# Patient Record
Sex: FEMALE | Race: WHITE | Employment: UNEMPLOYED | RURAL
[De-identification: names, ages, dates, MRNs, and addresses within clinical notes are randomized per-mention and may not be internally consistent; named-entity substitution may affect disease eponyms.]

---

## 2017-01-11 ENCOUNTER — OFFICE VISIT (OUTPATIENT)
Dept: PRIMARY CARE CLINIC | Age: 52
End: 2017-01-11
Payer: MEDICAID

## 2017-01-11 VITALS
DIASTOLIC BLOOD PRESSURE: 84 MMHG | HEIGHT: 65 IN | WEIGHT: 205 LBS | SYSTOLIC BLOOD PRESSURE: 126 MMHG | OXYGEN SATURATION: 98 % | BODY MASS INDEX: 34.16 KG/M2 | RESPIRATION RATE: 20 BRPM | HEART RATE: 95 BPM

## 2017-01-11 DIAGNOSIS — J43.1 PANLOBULAR EMPHYSEMA (HCC): Primary | Chronic | ICD-10-CM

## 2017-01-11 DIAGNOSIS — I10 ESSENTIAL HYPERTENSION: ICD-10-CM

## 2017-01-11 DIAGNOSIS — F41.9 ANXIETY: Chronic | ICD-10-CM

## 2017-01-11 DIAGNOSIS — G25.81 RLS (RESTLESS LEGS SYNDROME): ICD-10-CM

## 2017-01-11 DIAGNOSIS — K21.9 GASTROESOPHAGEAL REFLUX DISEASE WITHOUT ESOPHAGITIS: ICD-10-CM

## 2017-01-11 DIAGNOSIS — F33.41 RECURRENT MAJOR DEPRESSIVE DISORDER, IN PARTIAL REMISSION (HCC): ICD-10-CM

## 2017-01-11 PROCEDURE — 99214 OFFICE O/P EST MOD 30 MIN: CPT | Performed by: FAMILY MEDICINE

## 2017-01-11 RX ORDER — METOPROLOL TARTRATE 50 MG/1
50 TABLET, FILM COATED ORAL 2 TIMES DAILY
Qty: 60 TABLET | Refills: 5 | Status: SHIPPED | OUTPATIENT
Start: 2017-01-11 | End: 2017-09-15 | Stop reason: SDUPTHER

## 2017-01-11 RX ORDER — BENZONATATE 100 MG/1
100 CAPSULE ORAL 3 TIMES DAILY PRN
Qty: 30 CAPSULE | Refills: 1 | Status: SHIPPED | OUTPATIENT
Start: 2017-01-11 | End: 2017-01-18

## 2017-01-11 RX ORDER — RANITIDINE 150 MG/1
150 TABLET ORAL 2 TIMES DAILY
Qty: 60 TABLET | Refills: 3 | Status: SHIPPED | OUTPATIENT
Start: 2017-01-11 | End: 2017-02-27 | Stop reason: SDUPTHER

## 2017-01-11 ASSESSMENT — ENCOUNTER SYMPTOMS
EYE ITCHING: 0
BACK PAIN: 1
ABDOMINAL PAIN: 0
NAUSEA: 0
EYE DISCHARGE: 0
CONSTIPATION: 0
SORE THROAT: 0
DIARRHEA: 0
VOICE CHANGE: 1
COUGH: 0
EYE REDNESS: 0
VOMITING: 0
RHINORRHEA: 0
SHORTNESS OF BREATH: 0

## 2017-02-18 DIAGNOSIS — F33.9 EPISODE OF RECURRENT MAJOR DEPRESSIVE DISORDER, UNSPECIFIED DEPRESSION EPISODE SEVERITY (HCC): ICD-10-CM

## 2017-02-18 DIAGNOSIS — F41.9 ANXIETY: Chronic | ICD-10-CM

## 2017-02-20 RX ORDER — VENLAFAXINE HYDROCHLORIDE 75 MG/1
CAPSULE, EXTENDED RELEASE ORAL
Qty: 30 CAPSULE | Refills: 2 | Status: SHIPPED | OUTPATIENT
Start: 2017-02-20 | End: 2017-05-28 | Stop reason: SDUPTHER

## 2017-02-21 DIAGNOSIS — G89.29 CHRONIC NECK AND BACK PAIN: ICD-10-CM

## 2017-02-21 DIAGNOSIS — M54.2 CHRONIC NECK AND BACK PAIN: ICD-10-CM

## 2017-02-21 DIAGNOSIS — M54.9 CHRONIC NECK AND BACK PAIN: ICD-10-CM

## 2017-02-21 DIAGNOSIS — M54.2 NECK PAIN: ICD-10-CM

## 2017-02-21 RX ORDER — CYCLOBENZAPRINE HCL 10 MG
10 TABLET ORAL EVERY 8 HOURS PRN
Qty: 90 TABLET | Refills: 5 | Status: SHIPPED | OUTPATIENT
Start: 2017-02-21 | End: 2017-08-02 | Stop reason: SDUPTHER

## 2017-02-21 RX ORDER — BACLOFEN 10 MG/1
TABLET ORAL
Qty: 90 TABLET | Refills: 1 | OUTPATIENT
Start: 2017-02-21

## 2017-02-23 DIAGNOSIS — F41.9 ANXIETY: Chronic | ICD-10-CM

## 2017-02-23 RX ORDER — BUSPIRONE HYDROCHLORIDE 15 MG/1
TABLET ORAL
Qty: 60 TABLET | Refills: 1 | Status: SHIPPED | OUTPATIENT
Start: 2017-02-23 | End: 2017-04-14 | Stop reason: SDUPTHER

## 2017-02-27 DIAGNOSIS — M54.2 NECK PAIN: ICD-10-CM

## 2017-02-27 RX ORDER — BACLOFEN 10 MG/1
TABLET ORAL
Qty: 90 TABLET | Refills: 2 | Status: SHIPPED | OUTPATIENT
Start: 2017-02-27 | End: 2017-07-11 | Stop reason: CLARIF

## 2017-03-08 ENCOUNTER — OFFICE VISIT (OUTPATIENT)
Dept: NEUROLOGY | Facility: CLINIC | Age: 52
End: 2017-03-08

## 2017-03-08 VITALS
BODY MASS INDEX: 38.15 KG/M2 | OXYGEN SATURATION: 99 % | SYSTOLIC BLOOD PRESSURE: 128 MMHG | DIASTOLIC BLOOD PRESSURE: 84 MMHG | WEIGHT: 229 LBS | HEART RATE: 77 BPM | HEIGHT: 65 IN

## 2017-03-08 DIAGNOSIS — M54.12 CERVICAL RADICULOPATHY: ICD-10-CM

## 2017-03-08 DIAGNOSIS — M79.7 FIBROMYALGIA: Primary | ICD-10-CM

## 2017-03-08 DIAGNOSIS — M54.9 CHRONIC NECK AND BACK PAIN: ICD-10-CM

## 2017-03-08 DIAGNOSIS — G89.29 CHRONIC NECK AND BACK PAIN: ICD-10-CM

## 2017-03-08 DIAGNOSIS — G56.03 BILATERAL CARPAL TUNNEL SYNDROME: ICD-10-CM

## 2017-03-08 DIAGNOSIS — M54.2 CHRONIC NECK AND BACK PAIN: ICD-10-CM

## 2017-03-08 DIAGNOSIS — R60.0 PEDAL EDEMA: ICD-10-CM

## 2017-03-08 PROCEDURE — 99214 OFFICE O/P EST MOD 30 MIN: CPT | Performed by: PSYCHIATRY & NEUROLOGY

## 2017-03-08 RX ORDER — GABAPENTIN 800 MG/1
800 TABLET ORAL 4 TIMES DAILY
Qty: 120 TABLET | Refills: 5 | Status: SHIPPED | OUTPATIENT
Start: 2017-03-08 | End: 2017-06-08 | Stop reason: SDUPTHER

## 2017-03-08 RX ORDER — TRAMADOL HYDROCHLORIDE 50 MG/1
50 TABLET ORAL EVERY 6 HOURS PRN
Qty: 120 TABLET | Refills: 2
Start: 2017-03-08 | End: 2017-06-08 | Stop reason: SDUPTHER

## 2017-03-08 RX ORDER — DOXEPIN HYDROCHLORIDE 10 MG/1
10 CAPSULE ORAL NIGHTLY
Qty: 30 CAPSULE | Refills: 11 | Status: SHIPPED | OUTPATIENT
Start: 2017-03-08 | End: 2018-01-30 | Stop reason: SDUPTHER

## 2017-03-08 NOTE — PROGRESS NOTES
Subjective:     Patient ID: Gisselle White is a 51 y.o. female.    Back Pain   This is a chronic problem. The current episode started more than 1 year ago. The problem occurs constantly. The problem is unchanged. Associated symptoms include numbness and weakness. Pertinent negatives include no abdominal pain, chest pain, dysuria, fever or headaches.   Lower Extremity Issue   This is a chronic problem. The current episode started more than 1 year ago. The problem occurs intermittently. The problem has been unchanged. Associated symptoms include fatigue, numbness and weakness. Pertinent negatives include no abdominal pain, arthralgias, chest pain, chills, coughing, fever, headaches, joint swelling, myalgias, nausea, neck pain, rash, sore throat or vomiting.   Neck Pain    This is a chronic problem. The current episode started more than 1 year ago. The problem occurs intermittently. The problem has been unchanged. Associated symptoms include numbness and weakness. Pertinent negatives include no chest pain, fever, headaches or photophobia.   Upper Extremity Issue   The current episode started more than 1 year ago. The problem occurs intermittently. The problem has been unchanged. Associated symptoms include fatigue, numbness and weakness. Pertinent negatives include no abdominal pain, arthralgias, chest pain, chills, coughing, fever, headaches, joint swelling, myalgias, nausea, neck pain, rash, sore throat or vomiting.     The following portions of the patient's history were reviewed and updated as appropriate: allergies, current medications, past family history, past medical history, past social history, past surgical history and problem list.  Lower back still a problem, worse with with activity, has done PT which did not help, reports some sciatica, complains of some pedal edema. Remains on gabapentin. She has had an MRI of ls spine that showed a disk bulge at L4-5. She has persistent neck and arm pain.  Review of  Systems   Constitutional: Positive for fatigue and unexpected weight change. Negative for chills and fever.   HENT: Positive for postnasal drip and voice change. Negative for ear pain, hearing loss, nosebleeds, rhinorrhea and sore throat.    Eyes: Positive for itching. Negative for photophobia, pain, discharge and visual disturbance.   Respiratory: Positive for wheezing. Negative for cough, chest tightness and shortness of breath.    Cardiovascular: Negative for chest pain, palpitations and leg swelling.   Gastrointestinal: Negative for abdominal pain, blood in stool, constipation, diarrhea, nausea and vomiting.   Genitourinary: Negative for dysuria, frequency, hematuria and urgency.   Musculoskeletal: Negative for arthralgias, back pain, gait problem, joint swelling, myalgias, neck pain and neck stiffness.        Joint stiffness, limb pain, limb swelling   Skin: Negative for rash and wound.   Allergic/Immunologic: Negative for environmental allergies and food allergies.   Neurological: Positive for weakness and numbness. Negative for dizziness, tremors, seizures, syncope, speech difficulty, light-headedness and headaches.        Tingling and difficulty walking   Hematological: Negative for adenopathy. Does not bruise/bleed easily.   Psychiatric/Behavioral: Positive for dysphoric mood and sleep disturbance. Negative for agitation, confusion, decreased concentration, hallucinations and suicidal ideas. The patient is nervous/anxious.         Objective:    Neurologic Exam     Mental Status   Oriented to person, place, and time.     Cranial Nerves     CN III, IV, VI   Pupils are equal, round, and reactive to light.  Extraocular motions are normal.     Motor Exam     Strength   Strength 5/5 throughout.       Physical Exam   Constitutional: She is oriented to person, place, and time. She appears well-developed and well-nourished.   HENT:   Head: Normocephalic and atraumatic.   Eyes: EOM are normal. Pupils are equal,  round, and reactive to light.   Neck: Carotid bruit is not present.   Cardiovascular: Normal rate, regular rhythm, normal heart sounds and intact distal pulses.    Pulmonary/Chest: Effort normal and breath sounds normal.   Abdominal: Soft. Bowel sounds are normal.   Musculoskeletal:   Decreased ROM cspine and lspine.   Neurological: She is alert and oriented to person, place, and time. She has normal strength and normal reflexes. No cranial nerve deficit or sensory deficit. She displays a negative Romberg sign. Coordination and gait normal. She displays no Babinski's sign on the right side. She displays no Babinski's sign on the left side.   Skin: Skin is warm.   Psychiatric: She has a normal mood and affect. Her behavior is normal. Thought content normal. Cognition and memory are normal.       Assessment/Plan:     Gisselle was seen today for numbness.    Diagnoses and all orders for this visit:    Fibromyalgia  -     gabapentin (NEURONTIN) 800 MG tablet; Take 1 tablet by mouth 4 (Four) Times a Day.  -     traMADol (ULTRAM) 50 MG tablet; Take 1 tablet by mouth Every 6 (Six) Hours As Needed for moderate pain (4-6).  -     doxepin (SINEquan) 10 MG capsule; Take 1 capsule by mouth Every Night.    Bilateral carpal tunnel syndrome  -     doxepin (SINEquan) 10 MG capsule; Take 1 capsule by mouth Every Night.    Chronic neck and back pain  -     Ambulatory Referral to Pain Management  -     gabapentin (NEURONTIN) 800 MG tablet; Take 1 tablet by mouth 4 (Four) Times a Day.  -     traMADol (ULTRAM) 50 MG tablet; Take 1 tablet by mouth Every 6 (Six) Hours As Needed for moderate pain (4-6).  -     doxepin (SINEquan) 10 MG capsule; Take 1 capsule by mouth Every Night.    Cervical radiculopathy  -     Ambulatory Referral to Pain Management  -     MRI Cervical Spine Without Contrast  -     doxepin (SINEquan) 10 MG capsule; Take 1 capsule by mouth Every Night.    Pedal edema  -     Elastic Bandages & Supports (T.E.D. BELTED  THIGH/M-REGULAR) misc; 1 each Daily As Needed (leg edema).       The patient has read and signed the [facility] Controlled Substance Contract.  I will continue to see patient for regular follow up appointments.  They are well controlled on their medication.  GINO is updated every 3 months. The patient is aware of the potential for addiction and dependence.    The patient has read and signed the [facility] Controlled Substance Contract.  I will continue to see patient for regular follow up appointments.  They are well controlled on their medication.  GINO is updated every 3 months. The patient is aware of the potential for addiction. The patient has read and signed the [facility] Controlled Substance Contract.  I will continue to see patient for regular follow up appointments.  They are well controlled on their medication.  GINO is updated every 3 months. The patient is aware of the potential for addiction and dependence.on and dependence.

## 2017-03-24 ENCOUNTER — TELEPHONE (OUTPATIENT)
Dept: NEUROLOGY | Facility: CLINIC | Age: 52
End: 2017-03-24

## 2017-04-11 ENCOUNTER — OFFICE VISIT (OUTPATIENT)
Dept: PRIMARY CARE CLINIC | Age: 52
End: 2017-04-11
Payer: MEDICAID

## 2017-04-11 ENCOUNTER — HOSPITAL ENCOUNTER (OUTPATIENT)
Dept: OTHER | Age: 52
Discharge: HOME | End: 2017-04-11
Attending: FAMILY MEDICINE | Admitting: FAMILY MEDICINE

## 2017-04-11 VITALS
HEART RATE: 86 BPM | SYSTOLIC BLOOD PRESSURE: 120 MMHG | RESPIRATION RATE: 20 BRPM | DIASTOLIC BLOOD PRESSURE: 76 MMHG | OXYGEN SATURATION: 97 % | HEIGHT: 65 IN | BODY MASS INDEX: 36.92 KG/M2 | WEIGHT: 221.6 LBS

## 2017-04-11 DIAGNOSIS — I10 ESSENTIAL HYPERTENSION: Primary | Chronic | ICD-10-CM

## 2017-04-11 DIAGNOSIS — F41.9 ANXIETY: Chronic | ICD-10-CM

## 2017-04-11 DIAGNOSIS — F33.41 RECURRENT MAJOR DEPRESSIVE DISORDER, IN PARTIAL REMISSION (HCC): ICD-10-CM

## 2017-04-11 DIAGNOSIS — J43.1 PANLOBULAR EMPHYSEMA (HCC): Chronic | ICD-10-CM

## 2017-04-11 DIAGNOSIS — K21.9 GASTROESOPHAGEAL REFLUX DISEASE WITHOUT ESOPHAGITIS: ICD-10-CM

## 2017-04-11 LAB
A/G RATIO: 1.7 (ref 0.8–2)
ALBUMIN SERPL-MCNC: 4.4 G/DL (ref 3.4–4.8)
ALP BLD-CCNC: 91 U/L (ref 25–100)
ALT SERPL-CCNC: 17 U/L (ref 4–36)
ANION GAP SERPL CALCULATED.3IONS-SCNC: 15 MMOL/L (ref 3–16)
AST SERPL-CCNC: 15 U/L (ref 8–33)
BILIRUB SERPL-MCNC: <0.2 MG/DL (ref 0.3–1.2)
BUN BLDV-MCNC: 21 MG/DL (ref 6–20)
CALCIUM SERPL-MCNC: 9.6 MG/DL (ref 8.5–10.5)
CHLORIDE BLD-SCNC: 96 MMOL/L (ref 98–107)
CHOLESTEROL, TOTAL: 218 MG/DL (ref 0–200)
CO2: 26 MMOL/L (ref 20–30)
CREAT SERPL-MCNC: 0.7 MG/DL (ref 0.4–1.2)
GFR AFRICAN AMERICAN: >59
GFR NON-AFRICAN AMERICAN: >60
GLOBULIN: 2.6 G/DL
GLUCOSE BLD-MCNC: 93 MG/DL (ref 74–106)
HCT VFR BLD CALC: 38 % (ref 37–47)
HDLC SERPL-MCNC: 81 MG/DL (ref 40–60)
HEMOGLOBIN: 13.2 G/DL (ref 11.5–16.5)
LDL CHOLESTEROL CALCULATED: 100 MG/DL
LDL CHOLESTEROL DIRECT: 119 MG/DL
MCH RBC QN AUTO: 34.3 PG (ref 27–32)
MCHC RBC AUTO-ENTMCNC: 34.7 G/DL (ref 31–35)
MCV RBC AUTO: 98.7 FL (ref 80–100)
PDW BLD-RTO: 13.4 % (ref 11–16)
PLATELET # BLD: 312 K/UL (ref 150–400)
PMV BLD AUTO: 10 FL (ref 6–10)
POTASSIUM SERPL-SCNC: 3.7 MMOL/L (ref 3.4–5.1)
RBC # BLD: 3.85 M/UL (ref 3.8–5.8)
SODIUM BLD-SCNC: 137 MMOL/L (ref 136–145)
TOTAL PROTEIN: 7 G/DL (ref 6.4–8.3)
TRIGL SERPL-MCNC: 187 MG/DL (ref 0–249)
VLDLC SERPL CALC-MCNC: 37 MG/DL
WBC # BLD: 9.7 K/UL (ref 4–11)

## 2017-04-11 PROCEDURE — 99214 OFFICE O/P EST MOD 30 MIN: CPT | Performed by: FAMILY MEDICINE

## 2017-04-11 RX ORDER — RANITIDINE 150 MG/1
150 TABLET ORAL 2 TIMES DAILY
Qty: 60 TABLET | Refills: 3 | Status: SHIPPED | OUTPATIENT
Start: 2017-04-11 | End: 2017-07-11 | Stop reason: CLARIF

## 2017-04-11 RX ORDER — VARENICLINE TARTRATE 1 MG/1
1 TABLET, FILM COATED ORAL 2 TIMES DAILY
Qty: 60 TABLET | Refills: 3 | Status: SHIPPED | OUTPATIENT
Start: 2017-04-11

## 2017-04-11 RX ORDER — DOXEPIN HYDROCHLORIDE 10 MG/1
10 CAPSULE ORAL DAILY
COMMUNITY
Start: 2017-03-08

## 2017-04-11 RX ORDER — OMEPRAZOLE 20 MG/1
20 CAPSULE, DELAYED RELEASE ORAL DAILY
Qty: 30 CAPSULE | Refills: 3 | Status: SHIPPED | OUTPATIENT
Start: 2017-04-11 | End: 2017-07-25 | Stop reason: SDUPTHER

## 2017-04-11 ASSESSMENT — ENCOUNTER SYMPTOMS
DIARRHEA: 0
BACK PAIN: 1
SORE THROAT: 0
NAUSEA: 0
COUGH: 0
SHORTNESS OF BREATH: 1
EYE DISCHARGE: 0
CONSTIPATION: 0
ABDOMINAL PAIN: 0
VOMITING: 0
RHINORRHEA: 0
EYE ITCHING: 0

## 2017-04-14 DIAGNOSIS — F41.9 ANXIETY: Chronic | ICD-10-CM

## 2017-04-17 RX ORDER — FUROSEMIDE 20 MG/1
20 TABLET ORAL DAILY PRN
Qty: 60 TABLET | Refills: 1 | Status: SHIPPED | OUTPATIENT
Start: 2017-04-17 | End: 2017-07-25 | Stop reason: SDUPTHER

## 2017-04-17 RX ORDER — BUSPIRONE HYDROCHLORIDE 15 MG/1
TABLET ORAL
Qty: 60 TABLET | Refills: 0 | Status: SHIPPED | OUTPATIENT
Start: 2017-04-17 | End: 2017-05-15 | Stop reason: SDUPTHER

## 2017-04-18 ENCOUNTER — TELEPHONE (OUTPATIENT)
Dept: PRIMARY CARE CLINIC | Age: 52
End: 2017-04-18

## 2017-04-20 RX ORDER — ROPINIROLE 1 MG/1
TABLET, FILM COATED ORAL
Qty: 90 TABLET | Refills: 2 | Status: SHIPPED | OUTPATIENT
Start: 2017-04-20 | End: 2017-07-25 | Stop reason: SDUPTHER

## 2017-04-20 RX ORDER — FLUTICASONE PROPIONATE 50 MCG
SPRAY, SUSPENSION (ML) NASAL
Qty: 1 BOTTLE | Refills: 3 | Status: SHIPPED | OUTPATIENT
Start: 2017-04-20 | End: 2017-07-25 | Stop reason: SDUPTHER

## 2017-05-23 ENCOUNTER — PROCEDURE VISIT (OUTPATIENT)
Dept: NEUROLOGY | Facility: CLINIC | Age: 52
End: 2017-05-23

## 2017-05-23 DIAGNOSIS — R20.0 NUMBNESS AND TINGLING: Primary | ICD-10-CM

## 2017-05-23 DIAGNOSIS — R20.2 NUMBNESS AND TINGLING: Primary | ICD-10-CM

## 2017-05-23 PROCEDURE — 95911 NRV CNDJ TEST 9-10 STUDIES: CPT | Performed by: PSYCHIATRY & NEUROLOGY

## 2017-05-23 PROCEDURE — 95886 MUSC TEST DONE W/N TEST COMP: CPT | Performed by: PSYCHIATRY & NEUROLOGY

## 2017-05-24 ENCOUNTER — TELEPHONE (OUTPATIENT)
Dept: NEUROLOGY | Facility: CLINIC | Age: 52
End: 2017-05-24

## 2017-06-02 DIAGNOSIS — G89.29 CHRONIC NECK AND BACK PAIN: ICD-10-CM

## 2017-06-02 DIAGNOSIS — M54.2 CHRONIC NECK AND BACK PAIN: ICD-10-CM

## 2017-06-02 DIAGNOSIS — M79.7 FIBROMYALGIA: ICD-10-CM

## 2017-06-02 DIAGNOSIS — M54.9 CHRONIC NECK AND BACK PAIN: ICD-10-CM

## 2017-06-05 ENCOUNTER — TELEPHONE (OUTPATIENT)
Dept: NEUROLOGY | Facility: CLINIC | Age: 52
End: 2017-06-05

## 2017-06-05 RX ORDER — TRAMADOL HYDROCHLORIDE 50 MG/1
TABLET ORAL
Qty: 120 TABLET | Refills: 1 | OUTPATIENT
Start: 2017-06-05

## 2017-06-05 NOTE — TELEPHONE ENCOUNTER
PATIENT CALLED IN AND WAS CHECKING ON THE TRAMADOL. I EXPLAINED TO HER THAT IT WAS A CONTROLLED MEDICATION AND DR. CHAPMAN HAD TO GIVE THE OKAY. I DID EXPLAINED TO HER THAT HE HADN'T GOT BACK TO THE MEDICAL ASSISTANT YET. I ALSO CHECKED HER MED LIST AND HE PRESCRIBED THE MEDICINE ON 03/08/2017 AND HER APPT IS 06/08/2017 SO SHE SHOULD HAVE ENOUGH TO LAST. SHE STATED SHE DIDN'T KNOW WHAT HAPPENED TO THEM SHE TAKES THEM LIKE THEY ARE PRESCRIBED. I CALLED MAMEGANNESSA AND SHE STATED THAT SHE HAS TO MAKE HER APPT TO GET THE REFILL. SHE STATED THAT SHE UNDERSTOOD.

## 2017-06-07 DIAGNOSIS — M54.9 CHRONIC NECK AND BACK PAIN: ICD-10-CM

## 2017-06-07 DIAGNOSIS — M54.2 CHRONIC NECK AND BACK PAIN: ICD-10-CM

## 2017-06-07 DIAGNOSIS — G89.29 CHRONIC NECK AND BACK PAIN: ICD-10-CM

## 2017-06-07 DIAGNOSIS — M79.7 FIBROMYALGIA: ICD-10-CM

## 2017-06-08 ENCOUNTER — OFFICE VISIT (OUTPATIENT)
Dept: NEUROLOGY | Facility: CLINIC | Age: 52
End: 2017-06-08

## 2017-06-08 VITALS
HEART RATE: 97 BPM | OXYGEN SATURATION: 96 % | WEIGHT: 231 LBS | HEIGHT: 65 IN | BODY MASS INDEX: 38.49 KG/M2 | DIASTOLIC BLOOD PRESSURE: 98 MMHG | SYSTOLIC BLOOD PRESSURE: 145 MMHG

## 2017-06-08 DIAGNOSIS — M54.12 CERVICAL RADICULOPATHY: ICD-10-CM

## 2017-06-08 DIAGNOSIS — G56.03 BILATERAL CARPAL TUNNEL SYNDROME: ICD-10-CM

## 2017-06-08 DIAGNOSIS — G89.29 CHRONIC NECK AND BACK PAIN: ICD-10-CM

## 2017-06-08 DIAGNOSIS — M79.7 FIBROMYALGIA: ICD-10-CM

## 2017-06-08 DIAGNOSIS — M54.2 CHRONIC NECK AND BACK PAIN: ICD-10-CM

## 2017-06-08 DIAGNOSIS — M54.9 CHRONIC NECK AND BACK PAIN: ICD-10-CM

## 2017-06-08 PROCEDURE — 99214 OFFICE O/P EST MOD 30 MIN: CPT | Performed by: PSYCHIATRY & NEUROLOGY

## 2017-06-08 RX ORDER — TRAMADOL HYDROCHLORIDE 50 MG/1
TABLET ORAL
Qty: 120 TABLET | Refills: 1 | OUTPATIENT
Start: 2017-06-08

## 2017-06-08 RX ORDER — GABAPENTIN 800 MG/1
800 TABLET ORAL 4 TIMES DAILY
Qty: 120 TABLET | Refills: 5 | Status: SHIPPED | OUTPATIENT
Start: 2017-06-08 | End: 2017-09-14 | Stop reason: SDUPTHER

## 2017-06-08 RX ORDER — TRAMADOL HYDROCHLORIDE 50 MG/1
75 TABLET ORAL EVERY 6 HOURS PRN
Qty: 180 TABLET | Refills: 2
Start: 2017-06-08 | End: 2017-09-14 | Stop reason: SDUPTHER

## 2017-06-08 NOTE — PROGRESS NOTES
Subjective:     Patient ID: Gisselle White is a 51 y.o. female.    History of Present Illness  The following portions of the patient's history were reviewed and updated as appropriate: allergies, current medications, past family history, past medical history, past social history, past surgical history and problem list.  Aches and pains comes and go, hands hurt more, worse on the left, has had prior right CTS release at [facility] about 3 years ago, EMG showed bilateral moderate CTS,   scheduled for EMG/NCVs of LEs. [facility] denies MRI of c. Spine. She has had 2 lumbar epidurals so far. Needs refills, wants to increase tramadol dose as pain remains uncontrolled.  Review of Systems   Constitutional: Positive for fatigue. Negative for chills, fever and unexpected weight change.   HENT: Negative for ear pain, hearing loss, nosebleeds, rhinorrhea and sore throat.    Eyes: Positive for itching. Negative for photophobia, pain, discharge and visual disturbance.   Respiratory: Positive for shortness of breath. Negative for cough, chest tightness and wheezing.    Cardiovascular: Negative for chest pain, palpitations and leg swelling.   Gastrointestinal: Negative for abdominal pain, blood in stool, constipation, diarrhea, nausea and vomiting.   Endocrine:        MUSCLE WEAKNESS   Genitourinary: Negative for dysuria, frequency, hematuria and urgency.   Musculoskeletal: Negative for arthralgias, back pain, gait problem, joint swelling, myalgias, neck pain and neck stiffness.        JOINT STIFFNESS, LIMB PAIN & SWELLING   Skin: Negative for rash and wound.        DRY SKIN   Allergic/Immunologic: Negative for environmental allergies and food allergies.   Neurological: Positive for dizziness, weakness (LIMB) and numbness. Negative for tremors, seizures, syncope, speech difficulty, light-headedness and headaches.        TINGLING, DIFFICULTY WALKING   Hematological: Negative for adenopathy. Bruises/bleeds easily.   Psychiatric/Behavioral:  Positive for dysphoric mood and sleep disturbance. Negative for agitation, confusion, decreased concentration, hallucinations and suicidal ideas. The patient is nervous/anxious.         Objective:    Neurologic Exam     Mental Status   Oriented to person, place, and time.     Cranial Nerves     CN III, IV, VI   Pupils are equal, round, and reactive to light.  Extraocular motions are normal.     Motor Exam     Strength   Strength 5/5 throughout.       Physical Exam   Constitutional: She is oriented to person, place, and time. She appears well-developed and well-nourished.   HENT:   Head: Normocephalic and atraumatic.   Eyes: EOM are normal. Pupils are equal, round, and reactive to light.   Neck: Neck supple. Carotid bruit is not present.   Cardiovascular: Normal rate, regular rhythm, normal heart sounds and intact distal pulses.    Pulmonary/Chest: Effort normal and breath sounds normal.   Abdominal: Soft. Bowel sounds are normal.   Musculoskeletal:   Decreased ROM spine, positive bilateral Tinel's at wrists.   Neurological: She is alert and oriented to person, place, and time. She has normal strength and normal reflexes. No cranial nerve deficit or sensory deficit. She displays a negative Romberg sign. Coordination and gait normal. She displays no Babinski's sign on the right side. She displays no Babinski's sign on the left side.   Skin: Skin is warm.   Psychiatric: She has a normal mood and affect. Her behavior is normal. Thought content normal. Cognition and memory are normal.       Assessment/Plan:     Gisselle was seen today for numbness.    Diagnoses and all orders for this visit:    Chronic neck and back pain  -     traMADol (ULTRAM) 50 MG tablet; Take 1.5 tablets by mouth Every 6 (Six) Hours As Needed for Moderate Pain (4-6).  -     gabapentin (NEURONTIN) 800 MG tablet; Take 1 tablet by mouth 4 (Four) Times a Day.  -     Ambulatory Referral to Neurosurgery  -     Ambulatory Referral to Physical  Therapy    Fibromyalgia  -     traMADol (ULTRAM) 50 MG tablet; Take 1.5 tablets by mouth Every 6 (Six) Hours As Needed for Moderate Pain (4-6).  -     gabapentin (NEURONTIN) 800 MG tablet; Take 1 tablet by mouth 4 (Four) Times a Day.    Bilateral carpal tunnel syndrome  -     Ambulatory Referral to Neurosurgery    Cervical radiculopathy  -     Ambulatory Referral to Physical Therapy       The patient has read and signed the [facility] [employer] Substance Contract.  I will continue to see patient for regular follow up appointments.  They are well controlled on their medication.  GINO is updated every 3 months. The patient is aware of the potential for addiction and dependence.    The patient has read and signed the [facility] [employer] Substance Contract.  I will continue to see patient for regular follow up appointments.  They are well controlled on their medication.  GINO is updated every 3 months. The patient is aware of the potential for addiction. The patient has read and signed the [facility] [employer] Substance Contract.  I will continue to see patient for regular follow up appointments.  They are well controlled on their medication.  GINO is updated every 3 months. The patient is aware of the potential for addiction and dependence.on and dependence.

## 2017-06-09 ENCOUNTER — PROCEDURE VISIT (OUTPATIENT)
Dept: NEUROLOGY | Facility: CLINIC | Age: 52
End: 2017-06-09

## 2017-06-09 DIAGNOSIS — R20.2 NUMBNESS AND TINGLING: Primary | ICD-10-CM

## 2017-06-09 DIAGNOSIS — R20.0 NUMBNESS AND TINGLING: Primary | ICD-10-CM

## 2017-06-09 PROCEDURE — 95886 MUSC TEST DONE W/N TEST COMP: CPT | Performed by: PSYCHIATRY & NEUROLOGY

## 2017-06-09 PROCEDURE — 95911 NRV CNDJ TEST 9-10 STUDIES: CPT | Performed by: PSYCHIATRY & NEUROLOGY

## 2017-06-09 NOTE — PROGRESS NOTES
"Procedure   EMG & Nerve Conduction Test  Date/Time: 6/9/2017 12:53 PM  Performed by: SERENE CARDENAS  Authorized by: SERENE CARDENAS   Consent: Verbal consent obtained.            [facility]   [address], [address]  [place]  [address]   Phone: [phone]  FAX: [phone]           Patient: kyle lozano YOB: 1965  Gender: Female  Reason for study: see below in history section      Visit Date: 6/9/2017 13:26  Age: 51 Years 11 Months Old  Examining Physician: Soraida       The patient has a chief complaint and history of bilateral, upper extremity, lower extremity, numbness,    The patient is referred to have this problem evaluated today with EMG and nerve conduction studies.  The performing physician also acted as a technician on this study.  Please note that in the table \"NR\" stands for \"no response\" therefore testing was performed, but no response was elicited.    A brief neurological exam, revealed no abnormalities in muscle bulk strength reflexes and sensation.      Sensory NCS      Nerve / Sites Rec. Site Distance Onset Lat NP Amp Onset Jordy     cm ms µV m/s   L Sural - Ankle (Calf)      Calf Ankle 14 3.5 1.5 40   R Sural - Ankle (Calf)      Calf Ankle 14 3.5 2.2 40   L Superficial peroneal - Ankle      Lat leg Ankle 8 2.0 1.1 40       Motor NCS      Nerve / Sites Muscle Distance Latency Amplitude Velocity     cm ms mV m/s   L Peroneal - EDB      Ankle EDB 8 4.11 5.8       Fib head EDB 30 9.74 5.4 53   R Peroneal - EDB      Ankle EDB 8 4.27 5.6       Fib head EDB 30 10.73 5.5 46   L Tibial - AH      Ankle AH 8 4.32 2.4       Pop fossa AH 43 15.31 2.4 39   R Tibial - AH      Ankle AH 8 4.27 2.3       Pop fossa AH 43 15.52 2.3 38       F  Wave      Nerve F-min    ms   L Tibial - AH 45   L Peroneal - EDB 46   R Peroneal - EDB 45   R Tibial - AH 51       H Reflex      Nerve H Lat    ms   L Tibial - Soleus 34.0   R Tibial - Soleus 34.8       EMG Summary Table     " Spontaneous MUAP Recruitment    IA Fib PSW Fasc H.F. Amp Dur. PPP Pattern   L. GASTROCN (MED) N None None None None N N N N   L. TIB ANTERIOR N None None None None N N N N   L. ILIOPSOAS N None None None None N N N N   L. QUADRICEPS N None None None None N N N N   L. GLUTEUS MAX N None None None None N N N N   L. L.PARASPINALS N None None None None N N N N   R. L.PARASPINALS N None None None None N N N N   R. GASTROCN (MED) N None None None None N N N N   R. TIB ANTERIOR N None None None None N N N N   R. ILIOPSOAS N None None None None N N N N   R. QUADRICEPS N None None None None N N N N   R. GLUTEUS MAX N None None None None N N N N     1.) SNAPs and NCVs were normal  2.) CMAPs and NCVs were normal  3.) F-waves were normal in persistence and minimal latency.  4.) H-reflexes were normal  5.) EMGs were normal in all muscles tested.    There is currently no electrodiagnostic evidence of a neuromuscular disease.      All NCS were performed at 32C or greater.    The referring health care provider will discuss this report and possible further diagnostic and management options with the patient.      Alec Quigley M.D.                       Diagnoses and all orders for this visit:    Numbness and tingling  -     EMG & Nerve Conduction Test

## 2017-06-21 ENCOUNTER — TELEPHONE (OUTPATIENT)
Dept: NEUROLOGY | Facility: CLINIC | Age: 52
End: 2017-06-21

## 2017-06-21 NOTE — TELEPHONE ENCOUNTER
----- Message from Antonette Castañeda sent at 6/21/2017  9:57 AM EDT -----  Regarding: DR. LOTT  PATIENT CALLED IN, WANTING AN APPT.  SHE CAN'T OPEN HER HAND NOW.  SHE ALSO WANTS THE RESULTS OF HER X RAYS.  I DID VERIFY HER TELEPHONE.  THANKS

## 2017-06-22 NOTE — TELEPHONE ENCOUNTER
She can be seen sooner. Her nerve tests per Dr. OROZCO were normal, which XR is she looking for? Thanks

## 2017-06-28 DIAGNOSIS — M54.9 CHRONIC NECK AND BACK PAIN: ICD-10-CM

## 2017-06-28 DIAGNOSIS — M54.2 CHRONIC NECK AND BACK PAIN: ICD-10-CM

## 2017-06-28 DIAGNOSIS — G89.29 CHRONIC NECK AND BACK PAIN: ICD-10-CM

## 2017-06-28 RX ORDER — CELECOXIB 200 MG/1
CAPSULE ORAL
Qty: 30 CAPSULE | Refills: 3 | Status: SHIPPED | OUTPATIENT
Start: 2017-06-28 | End: 2017-10-29 | Stop reason: SDUPTHER

## 2017-06-30 RX ORDER — CETIRIZINE HYDROCHLORIDE 10 MG/1
10 TABLET ORAL DAILY
COMMUNITY
Start: 2017-05-30 | End: 2017-07-05

## 2017-06-30 RX ORDER — FUROSEMIDE 20 MG/1
20 TABLET ORAL DAILY
COMMUNITY
Start: 2017-05-15

## 2017-06-30 RX ORDER — OMEPRAZOLE 20 MG/1
20 CAPSULE, DELAYED RELEASE ORAL DAILY
COMMUNITY
Start: 2017-05-10 | End: 2017-07-05

## 2017-07-05 ENCOUNTER — OFFICE VISIT (OUTPATIENT)
Dept: NEUROSURGERY | Facility: CLINIC | Age: 52
End: 2017-07-05

## 2017-07-05 VITALS — TEMPERATURE: 98.6 F | BODY MASS INDEX: 38.15 KG/M2 | WEIGHT: 229 LBS | HEIGHT: 65 IN

## 2017-07-05 DIAGNOSIS — G56.03 BILATERAL CARPAL TUNNEL SYNDROME: ICD-10-CM

## 2017-07-05 DIAGNOSIS — M47.812 CERVICAL SPONDYLOSIS WITHOUT MYELOPATHY: Primary | ICD-10-CM

## 2017-07-05 PROCEDURE — 99243 OFF/OP CNSLTJ NEW/EST LOW 30: CPT | Performed by: NEUROLOGICAL SURGERY

## 2017-07-05 RX ORDER — VENLAFAXINE HYDROCHLORIDE 75 MG/1
75 CAPSULE, EXTENDED RELEASE ORAL DAILY
COMMUNITY
Start: 2017-06-28

## 2017-07-05 RX ORDER — DEXAMETHASONE 4 MG/1
TABLET ORAL
COMMUNITY
Start: 2017-06-18 | End: 2017-10-20

## 2017-07-05 RX ORDER — RANITIDINE 150 MG/1
TABLET ORAL
COMMUNITY
Start: 2017-06-21 | End: 2017-10-20

## 2017-07-05 NOTE — PROGRESS NOTES
Patient: Gisselle White  : 1965    Primary Care Provider: Lilia Dunne DO    Requesting Provider: Dr. Sheldon Olvera        History    Chief Complaint:   1.  Bilateral hand pain and numbness.  2.  Chronic neck and back pain.    History of Present Illness: Ms. White is a 52-year-old former  who is seeking her disability due to a litany of medical problems including chronic back and leg pain.  Her main complaint today however is pain in her hands and arms is been ongoing for greater than a year.  She previously had right carpal tunnel release performed at .  She complains of pain in her hands that extends up to the elbows.  She has some numbness and tingling in her hands.  As of late she is more afflicted in the right thumb as well as the 2 ulnar fingers of the hand.  She does not really describe radicular symptoms.  She has no focal weakness.  She reports no bowel or bladder dysfunction.    Review of Systems   Constitutional: Positive for fatigue. Negative for activity change, appetite change, chills, diaphoresis, fever and unexpected weight change.   HENT: Positive for congestion, hearing loss and sinus pressure. Negative for dental problem, drooling, ear discharge, ear pain, facial swelling, mouth sores, nosebleeds, postnasal drip, rhinorrhea, sneezing, sore throat, tinnitus, trouble swallowing and voice change.    Eyes: Negative for photophobia, pain, discharge, redness, itching and visual disturbance.   Respiratory: Positive for apnea, chest tightness and shortness of breath. Negative for cough, wheezing and stridor.    Cardiovascular: Negative for chest pain, palpitations and leg swelling.   Gastrointestinal: Negative for abdominal distention, abdominal pain, anal bleeding, blood in stool, constipation, diarrhea, nausea, rectal pain and vomiting.   Endocrine: Positive for cold intolerance, heat intolerance and polydipsia. Negative for polyphagia and polyuria.   Genitourinary:  "Negative for decreased urine volume, difficulty urinating, dysuria, enuresis, flank pain, frequency, genital sores, hematuria and urgency.   Musculoskeletal: Positive for arthralgias, back pain, myalgias, neck pain and neck stiffness. Negative for gait problem and joint swelling.   Skin: Negative for color change, pallor, rash and wound.   Allergic/Immunologic: Negative for environmental allergies, food allergies and immunocompromised state.   Neurological: Positive for numbness. Negative for dizziness, tremors, seizures, syncope, facial asymmetry, speech difficulty, weakness, light-headedness and headaches.   Hematological: Negative for adenopathy. Does not bruise/bleed easily.   Psychiatric/Behavioral: Positive for sleep disturbance. Negative for agitation, behavioral problems, confusion, decreased concentration, dysphoric mood, hallucinations, self-injury and suicidal ideas. The patient is nervous/anxious. The patient is not hyperactive.    All other systems reviewed and are negative.      The patient's past medical history, past surgical history, family history, and social history have been reviewed at length in the electronic medical record.    Physical Exam:   Temp 98.6 °F (37 °C) (Temporal Artery )   Ht 65\" (165.1 cm)  Wt 229 lb (104 kg)  BMI 38.11 kg/m2  CONSTITUTIONAL: Patient is well-nourished, pleasant and appears stated age.  CV: Heart regular rate and rhythm without murmur, rub, or gallop.  PULMONARY: Lungs are clear to ascultation.  MUSCULOSKELETAL:  Neck tenderness to palpation is not observed.   ROM in neck is normal.  There is some tenderness over the ulnar grooves, right greater than left.  Tinel sign is modestly positive at both wrists.  NEUROLOGICAL:  Orientation, memory, attention span, language function, and cognition have been examined and are intact.  Strength is intact in the upper and lower extremities to direct testing.  Muscle tone is normal throughout.  Station and gait are " normal.  Sensation is intact to light touch testing throughout.  Deep tendon reflexes are 1+ and symmetrical.  Frederic's Sign is negative bilaterally. No clonus is elicited.  Coordination is intact.      Medical Decision Making    Data Review:   Plain films of the cervical spine are fairly unrevealing.  Electrodiagnostic studies of the arms were unrevealing.    Diagnosis:   The patient has a rather diffuse symptomatology.  It's certainly not impossible that she has some low-grade peripheral nerve entrapment.    Treatment Options:   Given her diffuse symptoms and her largely unrevealing studies I have not recommended that she undergo surgical intervention and treatment should remain symptomatic.  She will follow-up in my clinic on an as-needed basis.       Diagnosis Plan   1. Cervical spondylosis without myelopathy     2. Bilateral carpal tunnel syndrome             I, Dr. Morelos, personally performed the services described in the documentation, as scribed in my presence, and it is both accurate and complete.  Scribed for Leandro Morelos MD by Michael Vaughn CMA on 07/05/2017 at 1:18 PM

## 2017-07-11 ENCOUNTER — OFFICE VISIT (OUTPATIENT)
Dept: PRIMARY CARE CLINIC | Age: 52
End: 2017-07-11
Payer: MEDICAID

## 2017-07-11 VITALS
DIASTOLIC BLOOD PRESSURE: 74 MMHG | BODY MASS INDEX: 38.82 KG/M2 | HEIGHT: 65 IN | WEIGHT: 233 LBS | SYSTOLIC BLOOD PRESSURE: 136 MMHG | OXYGEN SATURATION: 96 % | RESPIRATION RATE: 20 BRPM | HEART RATE: 105 BPM

## 2017-07-11 DIAGNOSIS — K21.9 GASTROESOPHAGEAL REFLUX DISEASE WITHOUT ESOPHAGITIS: ICD-10-CM

## 2017-07-11 DIAGNOSIS — I10 ESSENTIAL HYPERTENSION: Primary | Chronic | ICD-10-CM

## 2017-07-11 DIAGNOSIS — J43.1 PANLOBULAR EMPHYSEMA (HCC): Chronic | ICD-10-CM

## 2017-07-11 PROCEDURE — 99214 OFFICE O/P EST MOD 30 MIN: CPT | Performed by: FAMILY MEDICINE

## 2017-07-11 ASSESSMENT — ENCOUNTER SYMPTOMS
CONSTIPATION: 0
EYE ITCHING: 1
RHINORRHEA: 0
NAUSEA: 0
VOMITING: 0
SHORTNESS OF BREATH: 0
COUGH: 1
EYE DISCHARGE: 1
DIARRHEA: 0
ABDOMINAL PAIN: 1
SORE THROAT: 0

## 2017-07-11 ASSESSMENT — PATIENT HEALTH QUESTIONNAIRE - PHQ9
1. LITTLE INTEREST OR PLEASURE IN DOING THINGS: 0
SUM OF ALL RESPONSES TO PHQ QUESTIONS 1-9: 0
SUM OF ALL RESPONSES TO PHQ9 QUESTIONS 1 & 2: 0
2. FEELING DOWN, DEPRESSED OR HOPELESS: 0

## 2017-07-25 DIAGNOSIS — K21.9 GASTROESOPHAGEAL REFLUX DISEASE WITHOUT ESOPHAGITIS: ICD-10-CM

## 2017-07-25 DIAGNOSIS — F41.9 ANXIETY: Chronic | ICD-10-CM

## 2017-07-25 DIAGNOSIS — J43.1 PANLOBULAR EMPHYSEMA (HCC): Chronic | ICD-10-CM

## 2017-07-25 RX ORDER — BUSPIRONE HYDROCHLORIDE 15 MG/1
TABLET ORAL
Qty: 60 TABLET | Refills: 1 | Status: SHIPPED | OUTPATIENT
Start: 2017-07-25 | End: 2017-09-28 | Stop reason: SDUPTHER

## 2017-07-25 RX ORDER — ROPINIROLE 1 MG/1
TABLET, FILM COATED ORAL
Qty: 90 TABLET | Refills: 1 | Status: SHIPPED | OUTPATIENT
Start: 2017-07-25 | End: 2017-09-21 | Stop reason: SDUPTHER

## 2017-07-25 RX ORDER — FLUTICASONE PROPIONATE 50 MCG
SPRAY, SUSPENSION (ML) NASAL
Qty: 1 BOTTLE | Refills: 2 | Status: SHIPPED | OUTPATIENT
Start: 2017-07-25 | End: 2017-10-29 | Stop reason: SDUPTHER

## 2017-07-25 RX ORDER — FUROSEMIDE 20 MG/1
TABLET ORAL
Qty: 30 TABLET | Refills: 0 | Status: SHIPPED | OUTPATIENT
Start: 2017-07-25 | End: 2017-08-29 | Stop reason: SDUPTHER

## 2017-07-25 RX ORDER — OMEPRAZOLE 20 MG/1
CAPSULE, DELAYED RELEASE ORAL
Qty: 30 CAPSULE | Refills: 2 | Status: SHIPPED | OUTPATIENT
Start: 2017-07-25 | End: 2017-10-29 | Stop reason: SDUPTHER

## 2017-08-02 DIAGNOSIS — G89.29 CHRONIC NECK AND BACK PAIN: ICD-10-CM

## 2017-08-02 DIAGNOSIS — M54.2 CHRONIC NECK AND BACK PAIN: ICD-10-CM

## 2017-08-02 DIAGNOSIS — M54.9 CHRONIC NECK AND BACK PAIN: ICD-10-CM

## 2017-08-02 RX ORDER — CYCLOBENZAPRINE HCL 10 MG
TABLET ORAL
Qty: 90 TABLET | Refills: 4 | Status: SHIPPED | OUTPATIENT
Start: 2017-08-02

## 2017-08-29 RX ORDER — FUROSEMIDE 20 MG/1
TABLET ORAL
Qty: 30 TABLET | Refills: 0 | Status: SHIPPED | OUTPATIENT
Start: 2017-08-29 | End: 2017-09-25 | Stop reason: SDUPTHER

## 2017-09-14 ENCOUNTER — TELEPHONE (OUTPATIENT)
Dept: NEUROLOGY | Facility: CLINIC | Age: 52
End: 2017-09-14

## 2017-09-14 ENCOUNTER — OFFICE VISIT (OUTPATIENT)
Dept: NEUROLOGY | Facility: CLINIC | Age: 52
End: 2017-09-14

## 2017-09-14 VITALS
HEIGHT: 65 IN | SYSTOLIC BLOOD PRESSURE: 132 MMHG | DIASTOLIC BLOOD PRESSURE: 84 MMHG | BODY MASS INDEX: 36.65 KG/M2 | WEIGHT: 220 LBS

## 2017-09-14 DIAGNOSIS — G56.03 BILATERAL CARPAL TUNNEL SYNDROME: ICD-10-CM

## 2017-09-14 DIAGNOSIS — M54.2 CHRONIC NECK AND BACK PAIN: Primary | ICD-10-CM

## 2017-09-14 DIAGNOSIS — M54.9 CHRONIC NECK AND BACK PAIN: Primary | ICD-10-CM

## 2017-09-14 DIAGNOSIS — M79.7 FIBROMYALGIA: ICD-10-CM

## 2017-09-14 DIAGNOSIS — G89.29 CHRONIC NECK AND BACK PAIN: Primary | ICD-10-CM

## 2017-09-14 PROCEDURE — 99214 OFFICE O/P EST MOD 30 MIN: CPT | Performed by: PSYCHIATRY & NEUROLOGY

## 2017-09-14 RX ORDER — VARENICLINE TARTRATE 1 MG/1
1 TABLET, FILM COATED ORAL
COMMUNITY
Start: 2017-07-06 | End: 2017-11-01

## 2017-09-14 RX ORDER — CETIRIZINE HYDROCHLORIDE 10 MG/1
10 TABLET ORAL DAILY
COMMUNITY
Start: 2017-08-27

## 2017-09-14 RX ORDER — OMEPRAZOLE 20 MG/1
20 CAPSULE, DELAYED RELEASE ORAL DAILY
COMMUNITY
Start: 2017-08-30 | End: 2018-01-30 | Stop reason: ALTCHOICE

## 2017-09-14 RX ORDER — TRAMADOL HYDROCHLORIDE 50 MG/1
75 TABLET ORAL EVERY 6 HOURS PRN
Qty: 180 TABLET | Refills: 2
Start: 2017-09-14 | End: 2017-11-01

## 2017-09-14 RX ORDER — GABAPENTIN 800 MG/1
800 TABLET ORAL 4 TIMES DAILY
Qty: 120 TABLET | Refills: 5
Start: 2017-09-14 | End: 2017-10-20

## 2017-09-14 NOTE — TELEPHONE ENCOUNTER
Dr. Duncan's will see the pt for carpal tunnel releases. They just need a new referral in epic. Please place referral. Thank you!

## 2017-09-14 NOTE — PROGRESS NOTES
Subjective:     Patient ID: Gisselle White is a 52 y.o. female.    HPI:   History of Present Illness  The following portions of the patient's history were reviewed and updated as appropriate: allergies, current medications, past family history, past medical history, past social history, past surgical history and problem list.     Patient has seen Dr. Morelos, felt not be a surgical candidate for neck and back problems, wants referral to pain management. EMG/NCVs of legs were normal, moderate bilateral CTS in the arms. Complains of persistent hand and wrist pain, numbness, using wrist splints wants referral for CTS releases. Scheduled to start PT.      Past Medical History:   Diagnosis Date   • Anxiety    • Arthritis    • Depression    • Heart disease    • Hyperlipidemia    • Hypertension    • Migraine        Past Surgical History:   Procedure Laterality Date   • APPENDECTOMY     • CAROTID STENT     • CARPAL TUNNEL RELEASE Right 2011       Social History     Social History   • Marital status:      Spouse name: N/A   • Number of children: N/A   • Years of education: N/A     Occupational History   • Not on file.     Social History Main Topics   • Smoking status: Current Every Day Smoker     Packs/day: 0.50   • Smokeless tobacco: Never Used   • Alcohol use Yes      Comment: occasional   • Drug use: Defer   • Sexual activity: Defer     Other Topics Concern   • Not on file     Social History Narrative       Family History   Problem Relation Age of Onset   • Stroke Mother    • Heart disease Mother    • Diabetes Mother    • Hypertension Mother    • Cancer Father    • Cancer Sister    • Stroke Sister    • Hypertension Sister    • Hypertension Brother         Review of Systems   Constitutional: Positive for fatigue. Negative for chills, fever and unexpected weight change.   HENT: Positive for hearing loss. Negative for ear pain, nosebleeds, rhinorrhea and sore throat.    Eyes: Positive for itching. Negative for  photophobia, pain, discharge and visual disturbance.   Respiratory: Positive for cough, chest tightness, shortness of breath and wheezing.    Cardiovascular: Positive for leg swelling. Negative for chest pain and palpitations.   Gastrointestinal: Negative for abdominal pain, blood in stool, constipation, diarrhea, nausea and vomiting.   Endocrine: Positive for polydipsia.   Genitourinary: Negative for dysuria, frequency, hematuria and urgency.   Musculoskeletal: Positive for arthralgias, back pain, myalgias, neck pain and neck stiffness. Negative for gait problem and joint swelling.   Skin: Negative for rash and wound.   Allergic/Immunologic: Negative for environmental allergies and food allergies.   Neurological: Positive for dizziness, weakness and numbness. Negative for tremors, seizures, syncope, speech difficulty, light-headedness and headaches.   Hematological: Negative for adenopathy. Does not bruise/bleed easily.   Psychiatric/Behavioral: Positive for dysphoric mood and sleep disturbance. Negative for agitation, confusion, decreased concentration, hallucinations and suicidal ideas. The patient is nervous/anxious.         Objective:    Neurologic Exam     Mental Status   Oriented to person, place, and time.       Physical Exam   Constitutional: She is oriented to person, place, and time. She appears well-developed and well-nourished.   Cardiovascular: Normal rate and regular rhythm.    Pulmonary/Chest: Effort normal.   Neurological: She is alert and oriented to person, place, and time. She has normal reflexes.   Gait antalgic, uses a cane.   Psychiatric: She has a normal mood and affect. Her behavior is normal. Thought content normal.       Assessment/Plan:       Gisselle was seen today for neck pain and back pain.    Diagnoses and all orders for this visit:    Chronic neck and back pain  -     gabapentin (NEURONTIN) 800 MG tablet; Take 1 tablet by mouth 4 (Four) Times a Day.  -     traMADol (ULTRAM) 50 MG  tablet; Take 1.5 tablets by mouth Every 6 (Six) Hours As Needed for Moderate Pain .  -     Ambulatory Referral to Pain Management    Bilateral carpal tunnel syndrome  -     gabapentin (NEURONTIN) 800 MG tablet; Take 1 tablet by mouth 4 (Four) Times a Day.  -     Ambulatory Referral to Neurosurgery    Fibromyalgia  -     gabapentin (NEURONTIN) 800 MG tablet; Take 1 tablet by mouth 4 (Four) Times a Day.  -     traMADol (ULTRAM) 50 MG tablet; Take 1.5 tablets by mouth Every 6 (Six) Hours As Needed for Moderate Pain .       The patient has read and signed the [facility] Substance Contract.  I will continue to see patient for regular follow up appointments.  They are well controlled on their medication.  GINO is updated every 3 months. The patient is aware of the potential for addiction and dependence.    The patient has read and signed the [facility]stance Contract.  I will continue to see patient for regular follow up appointments.  They are well controlled on their medication.  GINO is updated every 3 months. The patient is aware of the potential for addiction. The patient has read and signed the [facility] Substance Contract.  I will continue to see patient for regular follow up appointments.  They are well controlled on their medication.  GINO is updated every 3 months. The patient is aware of the potential for addiction and dependence.on and dependence.      Sheldon Olvera MD  9/14/2017

## 2017-09-15 DIAGNOSIS — F33.9 EPISODE OF RECURRENT MAJOR DEPRESSIVE DISORDER, UNSPECIFIED DEPRESSION EPISODE SEVERITY (HCC): ICD-10-CM

## 2017-09-15 DIAGNOSIS — K21.9 GASTROESOPHAGEAL REFLUX DISEASE WITHOUT ESOPHAGITIS: ICD-10-CM

## 2017-09-15 DIAGNOSIS — I10 ESSENTIAL HYPERTENSION: ICD-10-CM

## 2017-09-15 DIAGNOSIS — F41.9 ANXIETY: Chronic | ICD-10-CM

## 2017-09-15 RX ORDER — VENLAFAXINE HYDROCHLORIDE 75 MG/1
CAPSULE, EXTENDED RELEASE ORAL
Qty: 30 CAPSULE | Refills: 2 | Status: SHIPPED | OUTPATIENT
Start: 2017-09-15 | End: 2017-12-04 | Stop reason: SDUPTHER

## 2017-09-15 RX ORDER — RANITIDINE 150 MG/1
TABLET ORAL
Qty: 60 TABLET | Refills: 2 | Status: SHIPPED | OUTPATIENT
Start: 2017-09-15

## 2017-09-15 RX ORDER — METOPROLOL TARTRATE 50 MG/1
TABLET, FILM COATED ORAL
Qty: 60 TABLET | Refills: 4 | Status: SHIPPED | OUTPATIENT
Start: 2017-09-15

## 2017-09-18 DIAGNOSIS — G56.03 BILATERAL CARPAL TUNNEL SYNDROME: Primary | ICD-10-CM

## 2017-09-21 RX ORDER — ROPINIROLE 1 MG/1
TABLET, FILM COATED ORAL
Qty: 90 TABLET | Refills: 0 | Status: SHIPPED | OUTPATIENT
Start: 2017-09-21 | End: 2017-10-18 | Stop reason: SDUPTHER

## 2017-09-26 DIAGNOSIS — J43.1 PANLOBULAR EMPHYSEMA (HCC): Chronic | ICD-10-CM

## 2017-09-26 DIAGNOSIS — E78.5 HYPERLIPIDEMIA, UNSPECIFIED HYPERLIPIDEMIA TYPE: ICD-10-CM

## 2017-09-26 RX ORDER — CETIRIZINE HYDROCHLORIDE 10 MG/1
TABLET ORAL
Qty: 30 TABLET | Refills: 3 | Status: SHIPPED | OUTPATIENT
Start: 2017-09-26

## 2017-09-26 RX ORDER — SIMVASTATIN 20 MG
TABLET ORAL
Qty: 30 TABLET | Refills: 3 | Status: SHIPPED | OUTPATIENT
Start: 2017-09-26

## 2017-09-28 DIAGNOSIS — F41.9 ANXIETY: Chronic | ICD-10-CM

## 2017-09-28 RX ORDER — BUSPIRONE HYDROCHLORIDE 15 MG/1
TABLET ORAL
Qty: 60 TABLET | Refills: 0 | Status: SHIPPED | OUTPATIENT
Start: 2017-09-28 | End: 2017-10-18 | Stop reason: SDUPTHER

## 2017-10-18 RX ORDER — ROPINIROLE 1 MG/1
TABLET, FILM COATED ORAL
Qty: 90 TABLET | Refills: 0 | Status: SHIPPED | OUTPATIENT
Start: 2017-10-18 | End: 2017-11-13 | Stop reason: SDUPTHER

## 2017-10-20 ENCOUNTER — OFFICE VISIT (OUTPATIENT)
Dept: NEUROSURGERY | Facility: CLINIC | Age: 52
End: 2017-10-20

## 2017-10-20 ENCOUNTER — PREP FOR SURGERY (OUTPATIENT)
Dept: OTHER | Facility: HOSPITAL | Age: 52
End: 2017-10-20

## 2017-10-20 VITALS — BODY MASS INDEX: 35.99 KG/M2 | WEIGHT: 216 LBS | TEMPERATURE: 96.8 F | HEIGHT: 65 IN

## 2017-10-20 DIAGNOSIS — G56.03 BILATERAL CARPAL TUNNEL SYNDROME: Primary | ICD-10-CM

## 2017-10-20 DIAGNOSIS — G56.02 CARPAL TUNNEL SYNDROME OF LEFT WRIST: Primary | ICD-10-CM

## 2017-10-20 DIAGNOSIS — M47.812 CERVICAL SPONDYLOSIS WITHOUT MYELOPATHY: ICD-10-CM

## 2017-10-20 PROCEDURE — 99213 OFFICE O/P EST LOW 20 MIN: CPT | Performed by: NEUROLOGICAL SURGERY

## 2017-10-20 RX ORDER — HYDROCODONE BITARTRATE AND ACETAMINOPHEN 5; 325 MG/1; MG/1
TABLET ORAL
COMMUNITY
Start: 2017-10-10 | End: 2017-10-20 | Stop reason: SDUPTHER

## 2017-10-20 RX ORDER — HYDROCODONE BITARTRATE AND ACETAMINOPHEN 5; 325 MG/1; MG/1
1 TABLET ORAL 2 TIMES DAILY PRN
Qty: 50 TABLET | Refills: 0 | Status: SHIPPED | OUTPATIENT
Start: 2017-10-20 | End: 2017-11-01

## 2017-10-20 RX ORDER — SODIUM CHLORIDE 0.9 % (FLUSH) 0.9 %
1-10 SYRINGE (ML) INJECTION AS NEEDED
Status: CANCELLED | OUTPATIENT
Start: 2017-10-20

## 2017-10-20 RX ORDER — CEFAZOLIN SODIUM 2 G/100ML
2 INJECTION, SOLUTION INTRAVENOUS ONCE
Status: CANCELLED | OUTPATIENT
Start: 2017-10-20 | End: 2017-10-20

## 2017-10-20 RX ORDER — BENZONATATE 100 MG/1
100 CAPSULE ORAL AS NEEDED
COMMUNITY
Start: 2017-10-04

## 2017-10-20 RX ORDER — BACLOFEN 10 MG/1
10 TABLET ORAL 3 TIMES DAILY
COMMUNITY
Start: 2017-10-05

## 2017-10-20 RX ORDER — SULFAMETHOXAZOLE AND TRIMETHOPRIM 800; 160 MG/1; MG/1
TABLET ORAL
COMMUNITY
Start: 2017-10-10 | End: 2017-11-01

## 2017-10-20 NOTE — PROGRESS NOTES
"Patient: Gisselle White  : 1965    Primary Care Provider: Provider Not In System    Requesting Provider: As above        History    Chief Complaint:   1.  Bilateral hand pain and numbness.  2.  Chronic neck pain.    History of Present Illness: Ms. White is a 52-year-old former  who is seeking her disability due to a litany of medical problems including chronic back and leg pain.  Her main complaint today however is pain in her hands and arms is been ongoing for greater than a year.  She previously had right carpal tunnel release performed at .  She complains of pain in her hands that extends up to the elbows.  She has some numbness and tingling in her hands.   The left hand afflicts her more than the right.  She is right-handed.  Her symptoms are worse at night.  Wrist splints have been utilized but have been marginally helpful.  She has neck pain but no radicular symptoms.    Review of Systems   Constitutional: Positive for activity change, diaphoresis and fatigue.   HENT: Positive for hearing loss.    Respiratory: Positive for apnea, cough, shortness of breath and wheezing.    Cardiovascular: Positive for leg swelling.   Endocrine: Positive for heat intolerance and polydipsia.   Genitourinary: Positive for difficulty urinating and flank pain.   Musculoskeletal: Positive for arthralgias, back pain, gait problem, joint swelling, myalgias, neck pain and neck stiffness.   Allergic/Immunologic: Positive for environmental allergies.   Neurological: Positive for dizziness, weakness, numbness and headaches.   Psychiatric/Behavioral: Positive for dysphoric mood and sleep disturbance. The patient is nervous/anxious.        The patient's past medical history, past surgical history, family history, and social history have been reviewed at length in the electronic medical record.    Physical Exam:   Temp 96.8 °F (36 °C) (Temporal Artery )   Ht 65\" (165.1 cm)  Wt 216 lb (98 kg)  BMI 35.94 " kg/m2  MUSCULOSKELETAL:  Neck tenderness to palpation is not observed.   ROM in neck is normal.  Tinel sign is positive at both wrists.  In the right hand were she reportedly had carpal tunnel surgery there is an incision in the distal wrist in line with the lateral thumb.  I do not see a carpal tunnel incision.  NEUROLOGICAL:  Strength is intact in the upper and lower extremities to direct testing.  Muscle tone is normal throughout.  Station and gait are normal.  Sensation is intact to light touch testing throughout.  Deep tendon reflexes are 1+ and symmetrical.  Frederic's Sign is negative bilaterally.         Medical Decision Making    Data Review:   Subacromial  I saw or thought that I saw electrodiagnostic studies that were within normal limits.  I may have misread that or seen a different study.  Her current study from the spring of this year and demonstrates moderate bilateral carpal tunnel syndrome.    Diagnosis:   1.  Bilateral carpal tunnel syndrome, left greater than right.  2.  Cervical spondylosis without radiculopathy    Treatment Options:   Given her ongoing to extremity symptoms I have recommended staged carpal tunnel release beginning on the left.  This will likely be helpful but it will probably not eradicate all of her symptoms.  The nature of the procedure as well as the potential risks, complications, limitations, and alternatives to the procedure were discussed at length with the patient and the patient has agreed to proceed with surgery.       Diagnosis Plan   1. Bilateral carpal tunnel syndrome  HYDROcodone-acetaminophen (NORCO) 5-325 MG per tablet   2. Cervical spondylosis without myelopathy       Scribed for Leandro Morelos MD by Kathleen Dominguez CMA on 10/20/2017 at 11:27 AM      I, Dr. Morelos, personally performed the services described in the documentation, as scribed in my presence, and it is both accurate and complete.

## 2017-10-20 NOTE — H&P
Patient: Gisselle White  : 1965     Primary Care Provider: Provider Not In System     Requesting Provider: As above           History     Chief Complaint:   1.  Bilateral hand pain and numbness.  2.  Chronic neck pain.     History of Present Illness: Ms. White is a 52-year-old former  who is seeking her disability due to a litany of medical problems including chronic back and leg pain.  Her main complaint today however is pain in her hands and arms is been ongoing for greater than a year.  She previously had right carpal tunnel release performed at .  She complains of pain in her hands that extends up to the elbows.  She has some numbness and tingling in her hands.   The left hand afflicts her more than the right.  She is right-handed.  Her symptoms are worse at night.  Wrist splints have been utilized but have been marginally helpful.  She has neck pain but no radicular symptoms.     Review of Systems   Constitutional: Positive for activity change, diaphoresis and fatigue.   HENT: Positive for hearing loss.    Respiratory: Positive for apnea, cough, shortness of breath and wheezing.    Cardiovascular: Positive for leg swelling.   Endocrine: Positive for heat intolerance and polydipsia.   Genitourinary: Positive for difficulty urinating and flank pain.   Musculoskeletal: Positive for arthralgias, back pain, gait problem, joint swelling, myalgias, neck pain and neck stiffness.   Allergic/Immunologic: Positive for environmental allergies.   Neurological: Positive for dizziness, weakness, numbness and headaches.   Psychiatric/Behavioral: Positive for dysphoric mood and sleep disturbance. The patient is nervous/anxious.          The patient's past medical history, past surgical history, family history, and social history have been reviewed at length in the electronic medical record.     Past Medical History:   Diagnosis Date   • Anxiety    • Arthritis    • Depression    • Heart disease    •  "Hyperlipidemia    • Hypertension    • Migraine      Past Surgical History:   Procedure Laterality Date   • APPENDECTOMY     • CAROTID STENT     • CARPAL TUNNEL RELEASE Right 2011     Family History   Problem Relation Age of Onset   • Stroke Mother    • Heart disease Mother    • Diabetes Mother    • Hypertension Mother    • Cancer Father    • Cancer Sister    • Stroke Sister    • Hypertension Sister    • Hypertension Brother      Social History     Social History   • Marital status:      Spouse name: N/A   • Number of children: N/A   • Years of education: N/A     Occupational History   • Not on file.     Social History Main Topics   • Smoking status: Current Every Day Smoker     Packs/day: 0.50   • Smokeless tobacco: Never Used   • Alcohol use Yes      Comment: occasional   • Drug use: Defer   • Sexual activity: Defer     Other Topics Concern   • Not on file     Social History Narrative     Allergies   Allergen Reactions   • Claritin [Loratadine]    • Darvon [Propoxyphene]    • Tylenol With Codeine #3 [Acetaminophen-Codeine]        Physical Exam:   Temp 96.8 °F (36 °C) (Temporal Artery )   Ht 65\" (165.1 cm)  Wt 216 lb (98 kg)  BMI 35.94 kg/m2  MUSCULOSKELETAL:  Neck tenderness to palpation is not observed.   ROM in neck is normal.  Tinel sign is positive at both wrists.  In the right hand were she reportedly had carpal tunnel surgery there is an incision in the distal wrist in line with the lateral thumb.  I do not see a carpal tunnel incision.  NEUROLOGICAL:  Strength is intact in the upper and lower extremities to direct testing.  Muscle tone is normal throughout.  Station and gait are normal.  Sensation is intact to light touch testing throughout.  Deep tendon reflexes are 1+ and symmetrical.  Frederic's Sign is negative bilaterally.            Medical Decision Making     Data Review:   Subacromial  I saw or thought that I saw electrodiagnostic studies that were within normal limits.  I may have misread " that or seen a different study.  Her current study from the spring of this year and demonstrates moderate bilateral carpal tunnel syndrome.     Diagnosis:   1.  Bilateral carpal tunnel syndrome, left greater than right.  2.  Cervical spondylosis without radiculopathy     Treatment Options:   Given her ongoing to extremity symptoms I have recommended staged carpal tunnel release beginning on the left.  This will likely be helpful but it will probably not eradicate all of her symptoms.  The nature of the procedure as well as the potential risks, complications, limitations, and alternatives to the procedure were discussed at length with the patient and the patient has agreed to proceed with surgery.          Diagnosis Plan   1. Bilateral carpal tunnel syndrome  HYDROcodone-acetaminophen (NORCO) 5-325 MG per tablet   2. Cervical spondylosis without myelopathy

## 2017-10-23 PROBLEM — G56.02 CARPAL TUNNEL SYNDROME OF LEFT WRIST: Status: ACTIVE | Noted: 2017-10-23

## 2017-10-25 ENCOUNTER — TELEPHONE (OUTPATIENT)
Dept: NEUROLOGY | Facility: CLINIC | Age: 52
End: 2017-10-25

## 2017-10-25 NOTE — TELEPHONE ENCOUNTER
Patient called, stated that she has completed the physical therapy for her neck and would like for Dr Olvera to order the MRI now. Advised her that he was probably waiting for her follow up appointment to order this. Please advise. Thanks!!

## 2017-10-26 DIAGNOSIS — M54.12 CERVICAL RADICULOPATHY: Primary | ICD-10-CM

## 2017-10-29 DIAGNOSIS — K21.9 GASTROESOPHAGEAL REFLUX DISEASE WITHOUT ESOPHAGITIS: ICD-10-CM

## 2017-10-29 DIAGNOSIS — M54.2 CHRONIC NECK AND BACK PAIN: ICD-10-CM

## 2017-10-29 DIAGNOSIS — G89.29 CHRONIC NECK AND BACK PAIN: ICD-10-CM

## 2017-10-29 DIAGNOSIS — J43.1 PANLOBULAR EMPHYSEMA (HCC): Chronic | ICD-10-CM

## 2017-10-29 DIAGNOSIS — M54.9 CHRONIC NECK AND BACK PAIN: ICD-10-CM

## 2017-10-30 RX ORDER — OMEPRAZOLE 20 MG/1
CAPSULE, DELAYED RELEASE ORAL
Qty: 30 CAPSULE | Refills: 1 | Status: SHIPPED | OUTPATIENT
Start: 2017-10-30

## 2017-10-30 RX ORDER — CELECOXIB 200 MG/1
CAPSULE ORAL
Qty: 30 CAPSULE | Refills: 2 | Status: SHIPPED | OUTPATIENT
Start: 2017-10-30

## 2017-10-30 RX ORDER — FLUTICASONE PROPIONATE 50 MCG
SPRAY, SUSPENSION (ML) NASAL
Qty: 1 BOTTLE | Refills: 1 | Status: SHIPPED | OUTPATIENT
Start: 2017-10-30

## 2017-11-01 ENCOUNTER — APPOINTMENT (OUTPATIENT)
Dept: PREADMISSION TESTING | Facility: HOSPITAL | Age: 52
End: 2017-11-01

## 2017-11-01 VITALS — WEIGHT: 212.08 LBS | BODY MASS INDEX: 35.34 KG/M2 | HEIGHT: 65 IN

## 2017-11-01 DIAGNOSIS — G56.02 CARPAL TUNNEL SYNDROME OF LEFT WRIST: Primary | ICD-10-CM

## 2017-11-01 LAB
DEPRECATED RDW RBC AUTO: 48.2 FL (ref 37–54)
ERYTHROCYTE [DISTWIDTH] IN BLOOD BY AUTOMATED COUNT: 13.2 % (ref 11.3–14.5)
HCT VFR BLD AUTO: 40.8 % (ref 34.5–44)
HGB BLD-MCNC: 13.8 G/DL (ref 11.5–15.5)
MCH RBC QN AUTO: 33.8 PG (ref 27–31)
MCHC RBC AUTO-ENTMCNC: 33.8 G/DL (ref 32–36)
MCV RBC AUTO: 100 FL (ref 80–99)
MRSA DNA SPEC QL NAA+PROBE: POSITIVE
PLATELET # BLD AUTO: 231 10*3/MM3 (ref 150–450)
PMV BLD AUTO: 10.7 FL (ref 6–12)
POTASSIUM BLD-SCNC: 4 MMOL/L (ref 3.5–5.5)
RBC # BLD AUTO: 4.08 10*6/MM3 (ref 3.89–5.14)
WBC NRBC COR # BLD: 8.09 10*3/MM3 (ref 3.5–10.8)

## 2017-11-01 PROCEDURE — 36415 COLL VENOUS BLD VENIPUNCTURE: CPT

## 2017-11-01 PROCEDURE — 84132 ASSAY OF SERUM POTASSIUM: CPT | Performed by: ANESTHESIOLOGY

## 2017-11-01 PROCEDURE — 85027 COMPLETE CBC AUTOMATED: CPT | Performed by: ANESTHESIOLOGY

## 2017-11-01 PROCEDURE — 87641 MR-STAPH DNA AMP PROBE: CPT | Performed by: ANESTHESIOLOGY

## 2017-11-01 PROCEDURE — 93005 ELECTROCARDIOGRAM TRACING: CPT

## 2017-11-01 PROCEDURE — 93010 ELECTROCARDIOGRAM REPORT: CPT | Performed by: INTERNAL MEDICINE

## 2017-11-01 RX ORDER — HYDROCODONE BITARTRATE AND ACETAMINOPHEN 10; 325 MG/1; MG/1
1 TABLET ORAL 3 TIMES DAILY PRN
COMMUNITY

## 2017-11-02 ENCOUNTER — HOSPITAL ENCOUNTER (OUTPATIENT)
Facility: HOSPITAL | Age: 52
Discharge: HOME | End: 2017-11-02
Attending: NEUROLOGICAL SURGERY | Admitting: NEUROLOGICAL SURGERY

## 2017-11-02 ENCOUNTER — ANESTHESIA (OUTPATIENT)
Dept: PERIOP | Facility: HOSPITAL | Age: 52
End: 2017-11-02

## 2017-11-02 ENCOUNTER — ANESTHESIA EVENT (OUTPATIENT)
Dept: PERIOP | Facility: HOSPITAL | Age: 52
End: 2017-11-02

## 2017-11-02 VITALS
DIASTOLIC BLOOD PRESSURE: 74 MMHG | RESPIRATION RATE: 16 BRPM | WEIGHT: 212.08 LBS | SYSTOLIC BLOOD PRESSURE: 126 MMHG | OXYGEN SATURATION: 97 % | BODY MASS INDEX: 35.34 KG/M2 | HEIGHT: 65 IN | TEMPERATURE: 97 F | HEART RATE: 70 BPM

## 2017-11-02 DIAGNOSIS — G56.02 CARPAL TUNNEL SYNDROME OF LEFT WRIST: ICD-10-CM

## 2017-11-02 PROCEDURE — 64721 CARPAL TUNNEL SURGERY: CPT | Performed by: NEUROLOGICAL SURGERY

## 2017-11-02 PROCEDURE — 25010000002 VANCOMYCIN: Performed by: PHYSICIAN ASSISTANT

## 2017-11-02 PROCEDURE — 25010000002 PROPOFOL 1000 MG/ML EMULSION: Performed by: NURSE ANESTHETIST, CERTIFIED REGISTERED

## 2017-11-02 PROCEDURE — 94640 AIRWAY INHALATION TREATMENT: CPT

## 2017-11-02 PROCEDURE — 25010000002 PROPOFOL 10 MG/ML EMULSION: Performed by: NURSE ANESTHETIST, CERTIFIED REGISTERED

## 2017-11-02 RX ORDER — SODIUM CHLORIDE 0.9 % (FLUSH) 0.9 %
1-10 SYRINGE (ML) INJECTION AS NEEDED
Status: DISCONTINUED | OUTPATIENT
Start: 2017-11-02 | End: 2017-11-02 | Stop reason: HOSPADM

## 2017-11-02 RX ORDER — LIDOCAINE HYDROCHLORIDE 10 MG/ML
0.5 INJECTION, SOLUTION EPIDURAL; INFILTRATION; INTRACAUDAL; PERINEURAL ONCE AS NEEDED
Status: COMPLETED | OUTPATIENT
Start: 2017-11-02 | End: 2017-11-02

## 2017-11-02 RX ORDER — LIDOCAINE HYDROCHLORIDE 10 MG/ML
INJECTION, SOLUTION INFILTRATION; PERINEURAL AS NEEDED
Status: DISCONTINUED | OUTPATIENT
Start: 2017-11-02 | End: 2017-11-02 | Stop reason: HOSPADM

## 2017-11-02 RX ORDER — MAGNESIUM HYDROXIDE 1200 MG/15ML
LIQUID ORAL AS NEEDED
Status: DISCONTINUED | OUTPATIENT
Start: 2017-11-02 | End: 2017-11-02 | Stop reason: HOSPADM

## 2017-11-02 RX ORDER — PROPOFOL 10 MG/ML
VIAL (ML) INTRAVENOUS AS NEEDED
Status: DISCONTINUED | OUTPATIENT
Start: 2017-11-02 | End: 2017-11-02 | Stop reason: SURG

## 2017-11-02 RX ORDER — FENTANYL CITRATE 50 UG/ML
50 INJECTION, SOLUTION INTRAMUSCULAR; INTRAVENOUS
Status: DISCONTINUED | OUTPATIENT
Start: 2017-11-02 | End: 2017-11-02 | Stop reason: HOSPADM

## 2017-11-02 RX ORDER — IPRATROPIUM BROMIDE AND ALBUTEROL SULFATE 2.5; .5 MG/3ML; MG/3ML
3 SOLUTION RESPIRATORY (INHALATION) ONCE
Status: COMPLETED | OUTPATIENT
Start: 2017-11-02 | End: 2017-11-02

## 2017-11-02 RX ORDER — FAMOTIDINE 10 MG/ML
20 INJECTION, SOLUTION INTRAVENOUS ONCE
Status: CANCELLED | OUTPATIENT
Start: 2017-11-02 | End: 2017-11-02

## 2017-11-02 RX ORDER — FAMOTIDINE 20 MG/1
20 TABLET, FILM COATED ORAL ONCE
Status: COMPLETED | OUTPATIENT
Start: 2017-11-02 | End: 2017-11-02

## 2017-11-02 RX ORDER — SODIUM CHLORIDE, SODIUM LACTATE, POTASSIUM CHLORIDE, CALCIUM CHLORIDE 600; 310; 30; 20 MG/100ML; MG/100ML; MG/100ML; MG/100ML
9 INJECTION, SOLUTION INTRAVENOUS CONTINUOUS
Status: DISCONTINUED | OUTPATIENT
Start: 2017-11-02 | End: 2017-11-02 | Stop reason: HOSPADM

## 2017-11-02 RX ORDER — HYDROMORPHONE HYDROCHLORIDE 1 MG/ML
0.5 INJECTION, SOLUTION INTRAMUSCULAR; INTRAVENOUS; SUBCUTANEOUS
Status: DISCONTINUED | OUTPATIENT
Start: 2017-11-02 | End: 2017-11-02 | Stop reason: HOSPADM

## 2017-11-02 RX ADMIN — PROPOFOL 50 MCG/KG/MIN: 10 INJECTION, EMULSION INTRAVENOUS at 11:31

## 2017-11-02 RX ADMIN — LIDOCAINE HYDROCHLORIDE 0.5 ML: 10 INJECTION, SOLUTION EPIDURAL; INFILTRATION; INTRACAUDAL; PERINEURAL at 10:24

## 2017-11-02 RX ADMIN — VANCOMYCIN HYDROCHLORIDE 1500 MG: 1 INJECTION, POWDER, LYOPHILIZED, FOR SOLUTION INTRAVENOUS at 10:24

## 2017-11-02 RX ADMIN — SODIUM CHLORIDE, POTASSIUM CHLORIDE, SODIUM LACTATE AND CALCIUM CHLORIDE 9 ML/HR: 600; 310; 30; 20 INJECTION, SOLUTION INTRAVENOUS at 10:24

## 2017-11-02 RX ADMIN — SODIUM CHLORIDE, POTASSIUM CHLORIDE, SODIUM LACTATE AND CALCIUM CHLORIDE: 600; 310; 30; 20 INJECTION, SOLUTION INTRAVENOUS at 11:25

## 2017-11-02 RX ADMIN — FAMOTIDINE 20 MG: 20 TABLET, FILM COATED ORAL at 10:24

## 2017-11-02 RX ADMIN — PROPOFOL 50 MG: 10 INJECTION, EMULSION INTRAVENOUS at 11:31

## 2017-11-02 RX ADMIN — IPRATROPIUM BROMIDE AND ALBUTEROL SULFATE 3 ML: .5; 3 SOLUTION RESPIRATORY (INHALATION) at 10:02

## 2017-11-02 NOTE — ANESTHESIA POSTPROCEDURE EVALUATION
"Patient: Gisselle White    Procedure Summary     Date Anesthesia Start Anesthesia Stop Room / Location    11/02/17 1125  [facility] / [facility]       Procedure Diagnosis Surgeon Provider    CARPAL TUNNEL RELEASE LEFT (Left Wrist) Carpal tunnel syndrome of left wrist  (Carpal tunnel syndrome of left wrist [G56.02]) MD Leandro Cowan MD          Anesthesia Type: MAC  Last vitals  BP   111/88 (11/02/17 1011)   Temp   96.7 °F (35.9 °C) (11/02/17 1011)   Pulse   76 (11/02/17 1011)   Resp   16 (11/02/17 1002)     SpO2   94 % (11/02/17 1011)     Post Anesthesia Care and Evaluation    Patient location during evaluation: PACU  Patient participation: complete - patient participated  Level of consciousness: awake and responsive to verbal stimuli  Pain score: 2  Pain management: adequate  Airway patency: patent  Anesthetic complications: No anesthetic complications  PONV Status: none  Cardiovascular status: acceptable  Respiratory status: acceptable  Hydration status: acceptable    Comments: Pt awake and responsive. SV. VSS. Report to RN. Patient Vitals in the past 24 hrs:  11/02/17 1011, BP:111/88, Temp:96.7 °F (35.9 °C), Temp src:Temporal Art, Pulse:76, SpO2:94 %, Height:65\" (165.1 cm), Weight:212 lb 1.3 oz (96.2 kg)  11/02/17 1002, Pulse:72, Resp:16, SpO2:95 %  133/78. p 72. r 16. t 98.1    Post 90/67 sat 99 temp 98 pulse 78 resp 17               "

## 2017-11-02 NOTE — OP NOTE
NEUROSURGICAL OPERATIVE NOTE        PREOPERATIVE DIAGNOSIS:    Left Carpal Tunnel Syndrome      POSTOPERATIVE DIAGNOSIS:  Same      PROCEDURE:  Left carpal tunnel release      SURGEON:  Leandro Morelos M.D.      ANESTHESIA:  Local/Mac      ESTIMATED BLOOD LOSS:  Minimal      SPECIMEN:  None      DRAINS:  None      COMPLICATIONS:  None      CLINICAL NOTE:  The patient is a 52 old woman with a history of pain and sensory alteration involving her hands and fingers.  This has been unremitting despite time and nonoperative measures.  Studies demonstrate significant carpal tunnel syndrome bilaterally.  She now presents for left carpal tunnel release.  The nature of the procedure as well as the potential risks, complications, limitations, and alternatives to the procedure were discussed at length with the patient and the patient has agreed to proceed with surgery.      TECHNICAL NOTE:  The patient was brought to the operating room and placed on the operating room table in supine position. She was provided sedation. A tourniquet was placed about her left upper extremity proximally.  The left upper extremity from the elbow to the fingertips was then prepared and draped in usual fashion.  A linear incision was marked in the left proximal palm in line with the 4th digit. This was infiltrated with 1% lidocaine with sodium bicarbonate.  Tourniquet was inflated and the incision fashioned.  Soft tissues were divided to provide exposure to the transverse carpal ligament.  This was divided sharply using scalpel and tenotomy scissors.  The dissection was conducted proximally and then distally until palmar fat was identified.  Good decompression of the nerve was achieved throughout its course.  The tourniquet was lowered and modest bleeding points were irrigated with bipolar cautery.  The wound was irrigated with an antibiotic-containing solution.  The incision was then closed in an interrupted vertical mattress fashion with 4-0  nylon sutures. Sterile dressing was applied.  She did receive preoperative antibiotics. She was subsequently taken to the recovery room in satisfactory condition.              Leandro Morelos M.D.

## 2017-11-02 NOTE — ANESTHESIA PREPROCEDURE EVALUATION
Anesthesia Evaluation     NPO Solid Status: > 8 hours  NPO Liquid Status: > 8 hours     Airway   Mallampati: II  TM distance: >3 FB  Neck ROM: full  difficult intubation highly probable  Dental    (+) edentulous    Pulmonary    (+) a smoker (Continues to smoke despite coronary stent, asthma, copd and home O2) Current, COPD, asthma (No ER visits, lungs feel good today), sleep apnea on CPAP, decreased breath sounds,   Wheezes: slight expiratory wheeze.  Cardiovascular     ECG reviewed  Rhythm: regular  Rate: normal    (+) hypertension, past MI , cardiac stents (stent placed 2003)   (-) pacemaker, angina, murmur, CABG      Neuro/Psych  (-) seizures, neuromuscular disease, TIA  GI/Hepatic/Renal/Endo    (+) morbid obesity,   (-) liver disease, no renal disease, diabetes    Musculoskeletal     Abdominal    Substance History      OB/GYN          Other                                        Anesthesia Plan    ASA 4     MAC     intravenous induction     Plan discussed with CRNA.

## 2017-11-02 NOTE — INTERVAL H&P NOTE
H&P reviewed. The patient was examined and there are no changes to the H&P.     Dr. Morelos's note from 10/21/2017 was reviewed.    ROS:  Resp: no recent URI or ED visits. Use home oxygen cont. Continues to smoke. Will get DUONEB in PREOP.    Heart: RRR  Lungs: Diminished in bases. No wheezing    Immunizations:    Tetanus:     Influenza:     Pneumo:    Labs were reviewed.     EKG: NSR     Plan: Carpal Tunnel Release Left

## 2017-11-03 ENCOUNTER — HOSPITAL ENCOUNTER (OUTPATIENT)
Dept: MRI IMAGING | Facility: HOSPITAL | Age: 52
Discharge: HOME | End: 2017-11-03
Attending: PSYCHIATRY & NEUROLOGY | Admitting: PSYCHIATRY & NEUROLOGY

## 2017-11-03 PROCEDURE — 72141 MRI NECK SPINE W/O DYE: CPT

## 2017-11-06 ENCOUNTER — TELEPHONE (OUTPATIENT)
Dept: NEUROLOGY | Facility: CLINIC | Age: 52
End: 2017-11-06

## 2017-11-06 NOTE — TELEPHONE ENCOUNTER
----- Message from Sheldon Olvera MD sent at 11/6/2017  7:39 AM EST -----  Regarding: MRI cspine  Normal, thanks  ----- Message -----     From: Interface, Rad Results [place] In     Sent: 11/3/2017   4:55 PM       To: Sheldon Olvera MD

## 2017-11-13 RX ORDER — ROPINIROLE 1 MG/1
TABLET, FILM COATED ORAL
Qty: 90 TABLET | Refills: 0 | Status: SHIPPED | OUTPATIENT
Start: 2017-11-13

## 2017-11-20 ENCOUNTER — OFFICE VISIT (OUTPATIENT)
Dept: NEUROSURGERY | Facility: CLINIC | Age: 52
End: 2017-11-20

## 2017-11-20 VITALS
WEIGHT: 209 LBS | HEIGHT: 65 IN | SYSTOLIC BLOOD PRESSURE: 120 MMHG | HEART RATE: 79 BPM | BODY MASS INDEX: 34.82 KG/M2 | TEMPERATURE: 98.2 F | DIASTOLIC BLOOD PRESSURE: 86 MMHG

## 2017-11-20 DIAGNOSIS — Z98.890 HISTORY OF CARPAL TUNNEL RELEASE: ICD-10-CM

## 2017-11-20 DIAGNOSIS — G56.02 CARPAL TUNNEL SYNDROME OF LEFT WRIST: Primary | ICD-10-CM

## 2017-11-20 PROCEDURE — 99024 POSTOP FOLLOW-UP VISIT: CPT | Performed by: PHYSICIAN ASSISTANT

## 2017-12-04 DIAGNOSIS — F33.9 EPISODE OF RECURRENT MAJOR DEPRESSIVE DISORDER, UNSPECIFIED DEPRESSION EPISODE SEVERITY (HCC): ICD-10-CM

## 2017-12-04 DIAGNOSIS — F41.9 ANXIETY: Chronic | ICD-10-CM

## 2017-12-04 RX ORDER — VENLAFAXINE HYDROCHLORIDE 75 MG/1
CAPSULE, EXTENDED RELEASE ORAL
Qty: 30 CAPSULE | Refills: 0 | Status: SHIPPED | OUTPATIENT
Start: 2017-12-04

## 2017-12-12 ENCOUNTER — OFFICE VISIT (OUTPATIENT)
Dept: NEUROLOGY | Facility: CLINIC | Age: 52
End: 2017-12-12

## 2017-12-12 VITALS
BODY MASS INDEX: 34.49 KG/M2 | HEART RATE: 73 BPM | WEIGHT: 207 LBS | OXYGEN SATURATION: 98 % | HEIGHT: 65 IN | SYSTOLIC BLOOD PRESSURE: 150 MMHG | DIASTOLIC BLOOD PRESSURE: 109 MMHG

## 2017-12-12 DIAGNOSIS — G56.03 BILATERAL CARPAL TUNNEL SYNDROME: ICD-10-CM

## 2017-12-12 DIAGNOSIS — M79.7 FIBROMYALGIA: Primary | ICD-10-CM

## 2017-12-12 DIAGNOSIS — M54.9 CHRONIC NECK AND BACK PAIN: ICD-10-CM

## 2017-12-12 DIAGNOSIS — M54.2 CHRONIC NECK AND BACK PAIN: ICD-10-CM

## 2017-12-12 DIAGNOSIS — I65.21 STENOSIS OF RIGHT CAROTID ARTERY: ICD-10-CM

## 2017-12-12 DIAGNOSIS — I10 ESSENTIAL HYPERTENSION: ICD-10-CM

## 2017-12-12 DIAGNOSIS — G89.29 CHRONIC NECK AND BACK PAIN: ICD-10-CM

## 2017-12-12 PROCEDURE — 99213 OFFICE O/P EST LOW 20 MIN: CPT | Performed by: PSYCHIATRY & NEUROLOGY

## 2017-12-12 RX ORDER — GABAPENTIN 800 MG/1
800 TABLET ORAL 4 TIMES DAILY
Qty: 120 TABLET | Refills: 5
Start: 2017-12-12

## 2017-12-12 NOTE — PROGRESS NOTES
Subjective:     Patient ID: Gisselle White is a 52 y.o. female.    CC:   Chief Complaint   Patient presents with   • Pain     neck and back        HPI:   History of Present Illness  The following portions of the patient's history were reviewed and updated as appropriate: allergies, current medications, past family history, past medical history, past social history, past surgical history and problem list.     S/P LCTS release, scheduled for RCTS. Followed by pain management. She is off tramadol. Needs gabapentin refills. She has a h/o right carotid stenosis, denies stroke symptoms, needs a follow up study.    Past Medical History:   Diagnosis Date   • Anxiety    • Arthritis    • Back pain    • COPD (chronic obstructive pulmonary disease)    • Depression    • Full dentures    • GERD (gastroesophageal reflux disease)    • Heart disease    • Hyperlipidemia    • Hypertension    • MI (myocardial infarction)     2003- 1 stent placed   • Migraine    • On home O2     2L prn    • ALONZO on CPAP     complaint    • Sleep apnea    • SOB (shortness of breath)    • Wears glasses        Past Surgical History:   Procedure Laterality Date   • APPENDECTOMY     • CAROTID STENT     • CARPAL TUNNEL RELEASE Right 2011   • CARPAL TUNNEL RELEASE Left 11/2/2017    Procedure: CARPAL TUNNEL RELEASE LEFT;  Surgeon: Leandro Morelos MD;  Location: [facility];  Service:    • COLONOSCOPY     • FRACTURE SURGERY      right arm- no hardware   • HYSTERECTOMY      partial left ovary   • TUBAL ABDOMINAL LIGATION     • WISDOM TOOTH EXTRACTION         Social History     Social History   • Marital status:      Spouse name: N/A   • Number of children: N/A   • Years of education: N/A     Occupational History   • Not on file.     Social History Main Topics   • Smoking status: Current Every Day Smoker     Packs/day: 0.50     Years: 33.00     Types: Cigarettes   • Smokeless tobacco: Never Used   • Alcohol use Yes      Comment: occasional   • Drug use: No   •  Sexual activity: Defer     Other Topics Concern   • Not on file     Social History Narrative       Family History   Problem Relation Age of Onset   • Stroke Mother    • Heart disease Mother    • Diabetes Mother    • Hypertension Mother    • Cancer Father    • Cancer Sister    • Stroke Sister    • Hypertension Sister    • Hypertension Brother         Review of Systems   Constitutional: Positive for fatigue. Negative for chills, fever and unexpected weight change.   HENT: Positive for hearing loss and postnasal drip. Negative for ear pain, nosebleeds, rhinorrhea and sore throat.    Eyes: Negative.  Negative for photophobia, pain, discharge, itching and visual disturbance.   Respiratory: Positive for shortness of breath. Negative for cough, chest tightness and wheezing.    Cardiovascular: Positive for leg swelling. Negative for chest pain and palpitations.   Gastrointestinal: Negative.  Negative for abdominal pain, blood in stool, constipation, diarrhea, nausea and vomiting.   Endocrine: Positive for heat intolerance.   Genitourinary: Negative.  Negative for dysuria, frequency, hematuria and urgency.   Musculoskeletal: Positive for arthralgias, back pain, gait problem, joint swelling, myalgias, neck pain and neck stiffness.   Skin: Negative.  Negative for rash and wound.   Allergic/Immunologic: Negative.  Negative for environmental allergies and food allergies.   Neurological: Positive for weakness, numbness and headaches. Negative for dizziness, tremors, seizures, syncope, speech difficulty and light-headedness.   Hematological: Negative for adenopathy. Bruises/bleeds easily.   Psychiatric/Behavioral: Positive for sleep disturbance. Negative for agitation, confusion, decreased concentration, hallucinations and suicidal ideas. The patient is nervous/anxious.         Depression          Objective:    Neurologic Exam     Mental Status   Oriented to person, place, and time.       Physical Exam   Constitutional: She is  oriented to person, place, and time. She appears well-developed and well-nourished.   Cardiovascular: Normal rate and regular rhythm.    Pulmonary/Chest: Effort normal.   Neurological: She is alert and oriented to person, place, and time. She has normal reflexes.   Psychiatric: She has a normal mood and affect. Her behavior is normal. Thought content normal.       Assessment/Plan:       Gisselle was seen today for pain.    Diagnoses and all orders for this visit:    Fibromyalgia  -     gabapentin (NEURONTIN) 800 MG tablet; Take 1 tablet by mouth 4 (Four) Times a Day.    Chronic neck and back pain  -     gabapentin (NEURONTIN) 800 MG tablet; Take 1 tablet by mouth 4 (Four) Times a Day.    Bilateral carpal tunnel syndrome  -     gabapentin (NEURONTIN) 800 MG tablet; Take 1 tablet by mouth 4 (Four) Times a Day.    Stenosis of right carotid artery  -     Duplex Carotid Ultrasound CAR    Essential hypertension        -     Follow up with primary     The patient has read and signed the [facility] Controlled Substance Contract.  I will continue to see patient for regular follow up appointments.  They are well controlled on their medication.  GINO is updated every 3 months. The patient is aware of the potential for addiction and dependence.      Sheldon Olvera MD  12/13/2017

## 2017-12-19 ENCOUNTER — OFFICE VISIT (OUTPATIENT)
Dept: NEUROSURGERY | Facility: CLINIC | Age: 52
End: 2017-12-19

## 2017-12-19 ENCOUNTER — PREP FOR SURGERY (OUTPATIENT)
Dept: OTHER | Facility: HOSPITAL | Age: 52
End: 2017-12-19

## 2017-12-19 VITALS — BODY MASS INDEX: 34.99 KG/M2 | WEIGHT: 210 LBS | HEIGHT: 65 IN | TEMPERATURE: 96.5 F

## 2017-12-19 DIAGNOSIS — G56.03 BILATERAL CARPAL TUNNEL SYNDROME: ICD-10-CM

## 2017-12-19 DIAGNOSIS — G56.01 CARPAL TUNNEL SYNDROME OF RIGHT WRIST: Primary | ICD-10-CM

## 2017-12-19 DIAGNOSIS — Z98.890 HISTORY OF CARPAL TUNNEL RELEASE: Primary | ICD-10-CM

## 2017-12-19 DIAGNOSIS — M47.812 CERVICAL SPONDYLOSIS WITHOUT MYELOPATHY: ICD-10-CM

## 2017-12-19 PROCEDURE — 99024 POSTOP FOLLOW-UP VISIT: CPT | Performed by: NEUROLOGICAL SURGERY

## 2017-12-19 RX ORDER — SODIUM CHLORIDE 0.9 % (FLUSH) 0.9 %
1-10 SYRINGE (ML) INJECTION AS NEEDED
Status: CANCELLED | OUTPATIENT
Start: 2017-12-19

## 2017-12-19 RX ORDER — CEFAZOLIN SODIUM 2 G/100ML
2 INJECTION, SOLUTION INTRAVENOUS ONCE
Status: CANCELLED | OUTPATIENT
Start: 2017-12-19 | End: 2017-12-19

## 2017-12-19 NOTE — H&P
Patient: Gisselle White  : 1965     Primary Care Provider: No Known Provider     Requesting Provider: As above           History     Chief Complaint:   1.  Bilateral hand pain and numbness.  2.  Chronic neck pain.     History of Present Illness: Ms. White is a 52 year old woman with chronic neck and back difficulties.  She is followed in a pain clinic setting.  Norco helps with her low back but does not help in a sustained fashion for her neck pain.  On 17 she underwent left carpal tunnel release and is doing much better in that regard.  Her left hand pain and numbness is improved.  She continues to complain of symptoms on the right side.     Review of Systems   Constitutional: Positive for activity change, diaphoresis and fatigue. Negative for appetite change, chills, fever and unexpected weight change.   HENT: Positive for hearing loss. Negative for congestion, dental problem, drooling, ear discharge, ear pain, facial swelling, mouth sores, nosebleeds, postnasal drip, rhinorrhea, sinus pressure, sneezing, sore throat, tinnitus, trouble swallowing and voice change.    Eyes: Negative for photophobia, pain, discharge, redness, itching and visual disturbance.   Respiratory: Positive for apnea, cough, shortness of breath and wheezing. Negative for choking, chest tightness and stridor.    Cardiovascular: Positive for leg swelling. Negative for chest pain and palpitations.   Gastrointestinal: Negative for abdominal distention, abdominal pain, anal bleeding, blood in stool, constipation, diarrhea, nausea, rectal pain and vomiting.   Endocrine: Positive for heat intolerance and polydipsia. Negative for cold intolerance, polyphagia and polyuria.   Genitourinary: Positive for difficulty urinating and flank pain. Negative for decreased urine volume, dysuria, enuresis, frequency, genital sores, hematuria and urgency.   Musculoskeletal: Positive for arthralgias, back pain, gait problem, joint swelling, myalgias,  neck pain and neck stiffness.   Skin: Negative for color change, pallor, rash and wound.   Allergic/Immunologic: Positive for environmental allergies. Negative for food allergies and immunocompromised state.   Neurological: Positive for dizziness, weakness, numbness and headaches. Negative for tremors, seizures, syncope, facial asymmetry, speech difficulty and light-headedness.   Hematological: Negative for adenopathy. Does not bruise/bleed easily.   Psychiatric/Behavioral: Positive for dysphoric mood and sleep disturbance. Negative for agitation, behavioral problems, confusion, decreased concentration, hallucinations, self-injury and suicidal ideas. The patient is nervous/anxious. The patient is not hyperactive.          The patient's past medical history, past surgical history, family history, and social history have been reviewed at length in the electronic medical record.     Past Medical History:   Diagnosis Date   • Anxiety    • Arthritis    • Back pain    • COPD (chronic obstructive pulmonary disease)    • Depression    • Full dentures    • GERD (gastroesophageal reflux disease)    • Heart disease    • Hyperlipidemia    • Hypertension    • MI (myocardial infarction)     2003- 1 stent placed   • Migraine    • On home O2     2L prn    • ALONZO on CPAP     complaint    • Sleep apnea    • SOB (shortness of breath)    • Wears glasses      Past Surgical History:   Procedure Laterality Date   • APPENDECTOMY     • CAROTID STENT     • CARPAL TUNNEL RELEASE Right 2011   • CARPAL TUNNEL RELEASE Left 11/2/2017    Procedure: CARPAL TUNNEL RELEASE LEFT;  Surgeon: Leandro Morelos MD;  Location: [facility];  Service:    • COLONOSCOPY     • FRACTURE SURGERY      right arm- no hardware   • HYSTERECTOMY      partial left ovary   • TUBAL ABDOMINAL LIGATION     • WISDOM TOOTH EXTRACTION       Family History   Problem Relation Age of Onset   • Stroke Mother    • Heart disease Mother    • Diabetes Mother    • Hypertension Mother    •  "Cancer Father    • Cancer Sister    • Stroke Sister    • Hypertension Sister    • Hypertension Brother      Social History     Social History   • Marital status:      Spouse name: N/A   • Number of children: N/A   • Years of education: N/A     Occupational History   • Not on file.     Social History Main Topics   • Smoking status: Current Every Day Smoker     Packs/day: 0.50     Years: 33.00     Types: Cigarettes   • Smokeless tobacco: Never Used   • Alcohol use Yes      Comment: occasional   • Drug use: No   • Sexual activity: Defer     Other Topics Concern   • Not on file     Social History Narrative     Allergies   Allergen Reactions   • Claritin [Loratadine] Hallucinations     Bugs on wall    • Codeine Hallucinations   • Darvon [Propoxyphene] Hallucinations       Physical Exam:   Temp 96.5 °F (35.8 °C)  Ht 165.1 cm (65\")  Wt 95.3 kg (210 lb)  BMI 34.95 kg/m2  MUSCULOSKELETAL:  Neck tenderness to palpation is not observed.   ROM in neck is normal.  Her left carpal tunnel incision is healing nicely.  NEUROLOGICAL:  Strength is intact in the upper and lower extremities to direct testing.  Muscle tone is normal throughout.  Station and gait are normal.  Sensation is intact to light touch testing throughout.  Deep tendon reflexes are 1+ and symmetrical.  Frederic's Sign is negative bilaterally.     Medical Decision Making     Data Review:   Electrodiagnostic studies have previously documented bilateral carpal tunnel syndrome.     Diagnosis:   1.  Bilateral carpal tunnel syndrome status post release on the left.  2.  Chronic neck pain, mechanical in nature.     Treatment Options:   We will make arrangements to pursue the second stage of her carpal tunnel treatment, right carpal tunnel release.  She would like to pursue this in January.  Once again she is well acquainted with the potential risks, complications, and limitations of the intervention.          Diagnosis Plan   1. History of carpal tunnel release "      2. Bilateral carpal tunnel syndrome      3. Cervical spondylosis without myelopathy

## 2017-12-19 NOTE — PROGRESS NOTES
Patient: Gisselle White  : 1965    Primary Care Provider: No Known Provider    Requesting Provider: As above        History    Chief Complaint:   1.  Bilateral hand pain and numbness.  2.  Chronic neck pain.    History of Present Illness: Ms. White is a 52 year old woman with chronic neck and back difficulties.  She is followed in a pain clinic setting.  Norco helps with her low back but does not help in a sustained fashion for her neck pain.  On 17 she underwent left carpal tunnel release and is doing much better in that regard.  Her left hand pain and numbness is improved.  She continues to complain of symptoms on the right side.    Review of Systems   Constitutional: Positive for activity change, diaphoresis and fatigue. Negative for appetite change, chills, fever and unexpected weight change.   HENT: Positive for hearing loss. Negative for congestion, dental problem, drooling, ear discharge, ear pain, facial swelling, mouth sores, nosebleeds, postnasal drip, rhinorrhea, sinus pressure, sneezing, sore throat, tinnitus, trouble swallowing and voice change.    Eyes: Negative for photophobia, pain, discharge, redness, itching and visual disturbance.   Respiratory: Positive for apnea, cough, shortness of breath and wheezing. Negative for choking, chest tightness and stridor.    Cardiovascular: Positive for leg swelling. Negative for chest pain and palpitations.   Gastrointestinal: Negative for abdominal distention, abdominal pain, anal bleeding, blood in stool, constipation, diarrhea, nausea, rectal pain and vomiting.   Endocrine: Positive for heat intolerance and polydipsia. Negative for cold intolerance, polyphagia and polyuria.   Genitourinary: Positive for difficulty urinating and flank pain. Negative for decreased urine volume, dysuria, enuresis, frequency, genital sores, hematuria and urgency.   Musculoskeletal: Positive for arthralgias, back pain, gait problem, joint swelling, myalgias, neck pain  "and neck stiffness.   Skin: Negative for color change, pallor, rash and wound.   Allergic/Immunologic: Positive for environmental allergies. Negative for food allergies and immunocompromised state.   Neurological: Positive for dizziness, weakness, numbness and headaches. Negative for tremors, seizures, syncope, facial asymmetry, speech difficulty and light-headedness.   Hematological: Negative for adenopathy. Does not bruise/bleed easily.   Psychiatric/Behavioral: Positive for dysphoric mood and sleep disturbance. Negative for agitation, behavioral problems, confusion, decreased concentration, hallucinations, self-injury and suicidal ideas. The patient is nervous/anxious. The patient is not hyperactive.        The patient's past medical history, past surgical history, family history, and social history have been reviewed at length in the electronic medical record.    Physical Exam:   Temp 96.5 °F (35.8 °C)  Ht 165.1 cm (65\")  Wt 95.3 kg (210 lb)  BMI 34.95 kg/m2  MUSCULOSKELETAL:  Neck tenderness to palpation is not observed.   ROM in neck is normal.  Her left carpal tunnel incision is healing nicely.  NEUROLOGICAL:  Strength is intact in the upper and lower extremities to direct testing.  Muscle tone is normal throughout.  Station and gait are normal.  Sensation is intact to light touch testing throughout.  Deep tendon reflexes are 1+ and symmetrical.  Frederic's Sign is negative bilaterally.    Medical Decision Making    Data Review:   Electrodiagnostic studies have previously documented bilateral carpal tunnel syndrome.    Diagnosis:   1.  Bilateral carpal tunnel syndrome status post release on the left.  2.  Chronic neck pain, mechanical in nature.    Treatment Options:   We will make arrangements to pursue the second stage of her carpal tunnel treatment, right carpal tunnel release.  She would like to pursue this in January.  Once again she is well acquainted with the potential risks, complications, and " limitations of the intervention.       Diagnosis Plan   1. History of carpal tunnel release     2. Bilateral carpal tunnel syndrome     3. Cervical spondylosis without myelopathy       Scribed for Leandro Morelos MD by Heather Brown CMA on 12/19/2017 at 1:09 PM    I, Dr. Morelos, personally performed the services described in the documentation, as scribed in my presence, and it is both accurate and complete.

## 2017-12-20 PROBLEM — G56.01 CARPAL TUNNEL SYNDROME OF RIGHT WRIST: Status: ACTIVE | Noted: 2017-12-20

## 2018-01-17 ENCOUNTER — APPOINTMENT (OUTPATIENT)
Dept: PREADMISSION TESTING | Facility: HOSPITAL | Age: 53
End: 2018-01-17

## 2018-01-17 VITALS — BODY MASS INDEX: 35.22 KG/M2 | HEIGHT: 65 IN | WEIGHT: 211.42 LBS

## 2018-01-17 DIAGNOSIS — G56.01 CARPAL TUNNEL SYNDROME OF RIGHT WRIST: ICD-10-CM

## 2018-01-17 LAB
DEPRECATED RDW RBC AUTO: 47 FL (ref 37–54)
ERYTHROCYTE [DISTWIDTH] IN BLOOD BY AUTOMATED COUNT: 13.1 % (ref 11.3–14.5)
HCT VFR BLD AUTO: 39 % (ref 34.5–44)
HGB BLD-MCNC: 13.1 G/DL (ref 11.5–15.5)
MCH RBC QN AUTO: 33.2 PG (ref 27–31)
MCHC RBC AUTO-ENTMCNC: 33.6 G/DL (ref 32–36)
MCV RBC AUTO: 98.7 FL (ref 80–99)
MRSA DNA SPEC QL NAA+PROBE: NEGATIVE
PLATELET # BLD AUTO: 301 10*3/MM3 (ref 150–450)
PMV BLD AUTO: 10.2 FL (ref 6–12)
POTASSIUM BLD-SCNC: 3.7 MMOL/L (ref 3.5–5.5)
RBC # BLD AUTO: 3.95 10*6/MM3 (ref 3.89–5.14)
WBC NRBC COR # BLD: 10.8 10*3/MM3 (ref 3.5–10.8)

## 2018-01-17 PROCEDURE — 87641 MR-STAPH DNA AMP PROBE: CPT | Performed by: NEUROLOGICAL SURGERY

## 2018-01-17 PROCEDURE — 36415 COLL VENOUS BLD VENIPUNCTURE: CPT

## 2018-01-17 PROCEDURE — 85027 COMPLETE CBC AUTOMATED: CPT | Performed by: ANESTHESIOLOGY

## 2018-01-17 PROCEDURE — 84132 ASSAY OF SERUM POTASSIUM: CPT | Performed by: ANESTHESIOLOGY

## 2018-01-17 RX ORDER — ASPIRIN 81 MG/1
81 TABLET ORAL DAILY
COMMUNITY

## 2018-01-17 NOTE — DISCHARGE INSTRUCTIONS
The following information and instructions were given:    NPO after MN except sips of water with routine prescribed medication (except blood thinner, diabetes, or weight reducing medication) unless otherwise instructed by your physician.  Do not eat, drink, smoke or chew gum after MN the night before surgery. This also includes no mints.    DO NOT shave for two days before your procedure.  Do not wear makeup.      DO NOT wear fingernail polish (gel/regular) and/or acrylic/artificial nails on the day of surgery.   If a patient had recent manicure and would rather not remove polish or artificial nails, then the minimum requirement is that the polish/artificial nails must be removed from the middle finger on each hand.      If patient was having surgery on an upper extremity, then the patient was instructed that fingernail polish/artificial fingernails must be removed for surgery.  NO EXCEPTIONS.      If patient was having surgery on a lower extremity, then the patient was instructed that toenail polish on both extremities must be removed for surgery.  NO EXCEPTIONS.    Remove all jewelry (advised to go to jeweler if unable to remove).  Jewelry especially rings can no longer be taped for surgery.    Leave anything you consider valuable at home.    Leave your suitcase in the car until after your surgery.    Bring the following with you (if applicable)   -picture ID and insurance cards   -Co-pay/deductible required by insurance   -Medications in the original bottles (not a list) including all over-the-counter  medications if not brought to PAT   -Copy of advance directive, living will or power of  documents if not  brought to PAT   -CPAP or BIPAP mask and tubing (do not bring machine)   -Skin prep instructions sheet   -PAT Pass    Education booklet, brochure, handout or binder given to patient.    Pain Control After Surgery handout given to patient.    Respirex use (handout given to patient) and pneumonia  prevention.    Signs and Symptoms of infection.    DVT Prevention stressing the importance of ambulation.    Patient to apply Chlorhexadine wipes to surgical area (as instructed) the night before procedure and the AM of procedure.    When applicable for ERAS patients (colon, orthropedic), patients were instructed to drink 20 ounces of Gatorade or G2 for diabetics (or until full) the morning of surgery.  The Gatorade or G2 must be consumed at least 3 hours before surgery start time.  No RED Gatorade or G2.  Appropriated ERAS handout given to patient during PAT visit.      Carpal tunnel book given

## 2018-01-18 ENCOUNTER — HOSPITAL ENCOUNTER (OUTPATIENT)
Dept: CARDIOLOGY | Facility: HOSPITAL | Age: 53
Discharge: HOME | End: 2018-01-18
Attending: PSYCHIATRY & NEUROLOGY

## 2018-01-18 ENCOUNTER — ANESTHESIA EVENT (OUTPATIENT)
Dept: PERIOP | Facility: HOSPITAL | Age: 53
End: 2018-01-18

## 2018-01-18 ENCOUNTER — HOSPITAL ENCOUNTER (OUTPATIENT)
Facility: HOSPITAL | Age: 53
Discharge: HOME | End: 2018-01-18
Attending: NEUROLOGICAL SURGERY | Admitting: NEUROLOGICAL SURGERY

## 2018-01-18 ENCOUNTER — TELEPHONE (OUTPATIENT)
Dept: NEUROLOGY | Facility: CLINIC | Age: 53
End: 2018-01-18

## 2018-01-18 ENCOUNTER — ANESTHESIA (OUTPATIENT)
Dept: PERIOP | Facility: HOSPITAL | Age: 53
End: 2018-01-18

## 2018-01-18 VITALS
TEMPERATURE: 98.3 F | OXYGEN SATURATION: 94 % | HEIGHT: 65 IN | SYSTOLIC BLOOD PRESSURE: 138 MMHG | WEIGHT: 211 LBS | DIASTOLIC BLOOD PRESSURE: 78 MMHG | RESPIRATION RATE: 14 BRPM | HEART RATE: 64 BPM | BODY MASS INDEX: 35.16 KG/M2

## 2018-01-18 DIAGNOSIS — G56.01 CARPAL TUNNEL SYNDROME OF RIGHT WRIST: ICD-10-CM

## 2018-01-18 LAB
BH CV XLRA MEAS LEFT DIST CCA EDV: 32 CM/SEC
BH CV XLRA MEAS LEFT DIST CCA PSV: 80 CM/SEC
BH CV XLRA MEAS LEFT DIST ICA EDV: 51 CM/SEC
BH CV XLRA MEAS LEFT DIST ICA PSV: 104 CM/SEC
BH CV XLRA MEAS LEFT ICA/CCA RATIO: 1.1
BH CV XLRA MEAS LEFT MID CCA EDV: 40 CM/SEC
BH CV XLRA MEAS LEFT MID CCA PSV: 94 CM/SEC
BH CV XLRA MEAS LEFT MID ICA EDV: 41 CM/SEC
BH CV XLRA MEAS LEFT MID ICA PSV: 88 CM/SEC
BH CV XLRA MEAS LEFT PROX CCA EDV: 36 CM/SEC
BH CV XLRA MEAS LEFT PROX CCA PSV: 110 CM/SEC
BH CV XLRA MEAS LEFT PROX ECA EDV: 38 CM/SEC
BH CV XLRA MEAS LEFT PROX ECA PSV: 138 CM/SEC
BH CV XLRA MEAS LEFT PROX ICA EDV: 33 CM/SEC
BH CV XLRA MEAS LEFT PROX ICA PSV: 72 CM/SEC
BH CV XLRA MEAS LEFT PROX SCLA EDV: 0.7 CM/SEC
BH CV XLRA MEAS LEFT PROX SCLA PSV: 98 CM/SEC
BH CV XLRA MEAS LEFT VERTEBRAL A EDV: 19 CM/SEC
BH CV XLRA MEAS LEFT VERTEBRAL A PSV: 43 CM/SEC
BH CV XLRA MEAS RIGHT DIST CCA EDV: 25 CM/SEC
BH CV XLRA MEAS RIGHT DIST CCA PSV: 73 CM/SEC
BH CV XLRA MEAS RIGHT DIST ICA EDV: 40 CM/SEC
BH CV XLRA MEAS RIGHT DIST ICA PSV: 94 CM/SEC
BH CV XLRA MEAS RIGHT ICA/CCA RATIO: 1.3
BH CV XLRA MEAS RIGHT MID CCA EDV: 29 CM/SEC
BH CV XLRA MEAS RIGHT MID CCA PSV: 80 CM/SEC
BH CV XLRA MEAS RIGHT MID ICA EDV: 36 CM/SEC
BH CV XLRA MEAS RIGHT MID ICA PSV: 98 CM/SEC
BH CV XLRA MEAS RIGHT PROX CCA EDV: 32 CM/SEC
BH CV XLRA MEAS RIGHT PROX CCA PSV: 94 CM/SEC
BH CV XLRA MEAS RIGHT PROX ECA EDV: 26 CM/SEC
BH CV XLRA MEAS RIGHT PROX ECA PSV: 90 CM/SEC
BH CV XLRA MEAS RIGHT PROX ICA EDV: 25 CM/SEC
BH CV XLRA MEAS RIGHT PROX ICA PSV: 68 CM/SEC
BH CV XLRA MEAS RIGHT PROX SCLA EDV: 1 CM/SEC
BH CV XLRA MEAS RIGHT PROX SCLA PSV: 114 CM/SEC
BH CV XLRA MEAS RIGHT VERTEBRAL A EDV: 13 CM/SEC
BH CV XLRA MEAS RIGHT VERTEBRAL A PSV: 44 CM/SEC
RIGHT ARM BP: NORMAL MMHG

## 2018-01-18 PROCEDURE — 25010000002 ONDANSETRON PER 1 MG: Performed by: NURSE ANESTHETIST, CERTIFIED REGISTERED

## 2018-01-18 PROCEDURE — 93880 EXTRACRANIAL BILAT STUDY: CPT

## 2018-01-18 PROCEDURE — 25010000002 PROPOFOL 10 MG/ML EMULSION: Performed by: NURSE ANESTHETIST, CERTIFIED REGISTERED

## 2018-01-18 PROCEDURE — 64721 CARPAL TUNNEL SURGERY: CPT | Performed by: NEUROLOGICAL SURGERY

## 2018-01-18 PROCEDURE — 25010000002 MIDAZOLAM PER 1 MG: Performed by: NURSE ANESTHETIST, CERTIFIED REGISTERED

## 2018-01-18 PROCEDURE — 93880 EXTRACRANIAL BILAT STUDY: CPT | Performed by: INTERNAL MEDICINE

## 2018-01-18 PROCEDURE — 25010000002 FENTANYL CITRATE (PF) 100 MCG/2ML SOLUTION: Performed by: NURSE ANESTHETIST, CERTIFIED REGISTERED

## 2018-01-18 RX ORDER — PROMETHAZINE HYDROCHLORIDE 25 MG/1
25 SUPPOSITORY RECTAL ONCE AS NEEDED
Status: DISCONTINUED | OUTPATIENT
Start: 2018-01-18 | End: 2018-01-18 | Stop reason: HOSPADM

## 2018-01-18 RX ORDER — PROMETHAZINE HYDROCHLORIDE 25 MG/1
25 TABLET ORAL ONCE AS NEEDED
Status: DISCONTINUED | OUTPATIENT
Start: 2018-01-18 | End: 2018-01-18 | Stop reason: HOSPADM

## 2018-01-18 RX ORDER — SODIUM CHLORIDE 0.9 % (FLUSH) 0.9 %
1-10 SYRINGE (ML) INJECTION AS NEEDED
Status: DISCONTINUED | OUTPATIENT
Start: 2018-01-18 | End: 2018-01-18

## 2018-01-18 RX ORDER — CEFAZOLIN SODIUM 2 G/100ML
2 INJECTION, SOLUTION INTRAVENOUS ONCE
Status: DISCONTINUED | OUTPATIENT
Start: 2018-01-18 | End: 2018-01-18 | Stop reason: HOSPADM

## 2018-01-18 RX ORDER — SODIUM CHLORIDE, SODIUM LACTATE, POTASSIUM CHLORIDE, CALCIUM CHLORIDE 600; 310; 30; 20 MG/100ML; MG/100ML; MG/100ML; MG/100ML
9 INJECTION, SOLUTION INTRAVENOUS CONTINUOUS PRN
Status: DISCONTINUED | OUTPATIENT
Start: 2018-01-18 | End: 2018-01-18 | Stop reason: HOSPADM

## 2018-01-18 RX ORDER — EPHEDRINE SULFATE 50 MG/ML
5 INJECTION, SOLUTION INTRAVENOUS ONCE AS NEEDED
Status: DISCONTINUED | OUTPATIENT
Start: 2018-01-18 | End: 2018-01-18 | Stop reason: HOSPADM

## 2018-01-18 RX ORDER — MAGNESIUM HYDROXIDE 1200 MG/15ML
LIQUID ORAL AS NEEDED
Status: DISCONTINUED | OUTPATIENT
Start: 2018-01-18 | End: 2018-01-18 | Stop reason: HOSPADM

## 2018-01-18 RX ORDER — LIDOCAINE HYDROCHLORIDE 10 MG/ML
0.5 INJECTION, SOLUTION EPIDURAL; INFILTRATION; INTRACAUDAL; PERINEURAL ONCE AS NEEDED
Status: COMPLETED | OUTPATIENT
Start: 2018-01-18 | End: 2018-01-18

## 2018-01-18 RX ORDER — PROMETHAZINE HYDROCHLORIDE 25 MG/ML
6.25 INJECTION, SOLUTION INTRAMUSCULAR; INTRAVENOUS ONCE AS NEEDED
Status: DISCONTINUED | OUTPATIENT
Start: 2018-01-18 | End: 2018-01-18 | Stop reason: HOSPADM

## 2018-01-18 RX ORDER — PROPOFOL 10 MG/ML
VIAL (ML) INTRAVENOUS CONTINUOUS PRN
Status: DISCONTINUED | OUTPATIENT
Start: 2018-01-18 | End: 2018-01-18 | Stop reason: SURG

## 2018-01-18 RX ORDER — FAMOTIDINE 20 MG/1
20 TABLET, FILM COATED ORAL EVERY 12 HOURS SCHEDULED
Status: DISCONTINUED | OUTPATIENT
Start: 2018-01-18 | End: 2018-01-18 | Stop reason: HOSPADM

## 2018-01-18 RX ORDER — OXYCODONE HYDROCHLORIDE AND ACETAMINOPHEN 5; 325 MG/1; MG/1
1 TABLET ORAL ONCE AS NEEDED
Status: DISCONTINUED | OUTPATIENT
Start: 2018-01-18 | End: 2018-01-18 | Stop reason: HOSPADM

## 2018-01-18 RX ORDER — FAMOTIDINE 10 MG/ML
20 INJECTION, SOLUTION INTRAVENOUS EVERY 12 HOURS SCHEDULED
Status: DISCONTINUED | OUTPATIENT
Start: 2018-01-18 | End: 2018-01-18 | Stop reason: HOSPADM

## 2018-01-18 RX ORDER — BUDESONIDE AND FORMOTEROL FUMARATE DIHYDRATE 160; 4.5 UG/1; UG/1
2 AEROSOL RESPIRATORY (INHALATION)
COMMUNITY

## 2018-01-18 RX ORDER — FENTANYL CITRATE 50 UG/ML
50 INJECTION, SOLUTION INTRAMUSCULAR; INTRAVENOUS
Status: DISCONTINUED | OUTPATIENT
Start: 2018-01-18 | End: 2018-01-18 | Stop reason: HOSPADM

## 2018-01-18 RX ORDER — FENTANYL CITRATE 50 UG/ML
INJECTION, SOLUTION INTRAMUSCULAR; INTRAVENOUS AS NEEDED
Status: DISCONTINUED | OUTPATIENT
Start: 2018-01-18 | End: 2018-01-18 | Stop reason: SURG

## 2018-01-18 RX ORDER — MIDAZOLAM HYDROCHLORIDE 1 MG/ML
INJECTION INTRAMUSCULAR; INTRAVENOUS AS NEEDED
Status: DISCONTINUED | OUTPATIENT
Start: 2018-01-18 | End: 2018-01-18 | Stop reason: SURG

## 2018-01-18 RX ORDER — PROPOFOL 10 MG/ML
VIAL (ML) INTRAVENOUS AS NEEDED
Status: DISCONTINUED | OUTPATIENT
Start: 2018-01-18 | End: 2018-01-18 | Stop reason: SURG

## 2018-01-18 RX ORDER — ONDANSETRON 2 MG/ML
INJECTION INTRAMUSCULAR; INTRAVENOUS AS NEEDED
Status: DISCONTINUED | OUTPATIENT
Start: 2018-01-18 | End: 2018-01-18 | Stop reason: SURG

## 2018-01-18 RX ORDER — SODIUM CHLORIDE 0.9 % (FLUSH) 0.9 %
1-10 SYRINGE (ML) INJECTION AS NEEDED
Status: DISCONTINUED | OUTPATIENT
Start: 2018-01-18 | End: 2018-01-18 | Stop reason: HOSPADM

## 2018-01-18 RX ORDER — LIDOCAINE HYDROCHLORIDE 10 MG/ML
INJECTION, SOLUTION INFILTRATION; PERINEURAL AS NEEDED
Status: DISCONTINUED | OUTPATIENT
Start: 2018-01-18 | End: 2018-01-18 | Stop reason: HOSPADM

## 2018-01-18 RX ADMIN — FAMOTIDINE 20 MG: 20 TABLET, FILM COATED ORAL at 09:48

## 2018-01-18 RX ADMIN — LIDOCAINE HYDROCHLORIDE 0.2 ML: 10 INJECTION, SOLUTION EPIDURAL; INFILTRATION; INTRACAUDAL; PERINEURAL at 09:45

## 2018-01-18 RX ADMIN — SODIUM CHLORIDE, POTASSIUM CHLORIDE, SODIUM LACTATE AND CALCIUM CHLORIDE 9 ML/HR: 600; 310; 30; 20 INJECTION, SOLUTION INTRAVENOUS at 09:45

## 2018-01-18 RX ADMIN — FENTANYL CITRATE 100 MCG: 50 INJECTION, SOLUTION INTRAMUSCULAR; INTRAVENOUS at 11:10

## 2018-01-18 RX ADMIN — PROPOFOL 45 MCG/KG/MIN: 10 INJECTION, EMULSION INTRAVENOUS at 11:10

## 2018-01-18 RX ADMIN — ONDANSETRON 4 MG: 2 INJECTION INTRAMUSCULAR; INTRAVENOUS at 11:32

## 2018-01-18 RX ADMIN — PROPOFOL 50 MG: 10 INJECTION, EMULSION INTRAVENOUS at 11:10

## 2018-01-18 RX ADMIN — MIDAZOLAM HYDROCHLORIDE 2 MG: 1 INJECTION, SOLUTION INTRAMUSCULAR; INTRAVENOUS at 11:10

## 2018-01-18 NOTE — TELEPHONE ENCOUNTER
----- Message from Sheldon Olvera MD sent at 1/18/2018  4:41 PM EST -----  Regarding: carotid duplex  Normal, thanks  ----- Message -----     From: Chauncey Pinon MD     Sent: 1/18/2018   2:32 PM       To: Sheldon Olvera MD

## 2018-01-18 NOTE — OP NOTE
NEUROSURGICAL OPERATIVE NOTE        PREOPERATIVE DIAGNOSIS:    Right carpal tunnel syndrome      POSTOPERATIVE DIAGNOSIS:  Right carpal tunnel syndrome      PROCEDURE:  Right carpal tunnel release      SURGEON:  Leandro Morelos M.D.      ASSISTANT:  None      ANESTHESIA:  Local Mac      ESTIMATED BLOOD LOSS:  Trace      SPECIMEN:  None      DRAINS:  None      COMPLICATIONS:  None      CLINICAL NOTE:  The patient is a 52 year old woman with a history of bilateral upper extremity pain and numbness.  Electrodiagnostic studies have confirmed bilateral carpal tunnel syndrome.  Previously she underwent left carpal tunnel release.  She now presents for right carpal tunnel release to address her right-sided symptoms.  The nature of the procedure as well as the potential risks, complications, limitations, and alternatives to the procedure were discussed at length with the patient and the patient has agreed to proceed with surgery.      TECHNICAL NOTE:  The patient was brought to the operating room and placed on the operating room table in the supine position.  She was provided sedation.  The tourniquet was placed about her right upper extremity proximally.  Her right upper extremity from the elbow to the fingertips was prepared and draped in the usual fashion.  A linear incision was marked in the right proximal palm in line with the fourth digit.  This was infiltrated with 1% lidocaine with sodium bicarbonate.  The tourniquet was inflated and the incision was fashioned.  Underlying transverse carpal ligament was identified and divided sharply with scalpel and tenotomy scissors.  The dissection was conducted proximally and then distally until palmar fat was identified.  Good decompression of the medial nerve was accomplished.  The tourniquet was lowered and modest bleeding points were controlled with bipolar cautery.  The wound was then closed in an interrupted vertical mattress fashion with 4-0 nylon.  A sterile dressing  was applied.  The patient did receive preoperative antibiotics.  She was subsequently taken to recovery room in satisfactory condition.  There were no overt intraoperative complications.            Leandro Morelos M.D.

## 2018-01-18 NOTE — PLAN OF CARE
Problem: Patient Care Overview (Adult)  Goal: Plan of Care Review  Outcome: Ongoing (interventions implemented as appropriate)   01/18/18 0973   Coping/Psychosocial Response Interventions   Plan Of Care Reviewed With patient   Patient Care Overview   Progress improving

## 2018-01-18 NOTE — ANESTHESIA POSTPROCEDURE EVALUATION
Patient: Gisselle White    Procedure Summary     Date Anesthesia Start Anesthesia Stop Room / Location    01/18/18 1109 1138 [facility] / [facility]       Procedure Diagnosis Surgeon Provider    CARPAL TUNNEL RELEASE RIGHT  (Right Wrist) Carpal tunnel syndrome of right wrist  (Carpal tunnel syndrome of right wrist [G56.01]) MD Son Cowan MD          Anesthesia Type: MAC  Last vitals  BP   131/96 (01/18/18 0952)   Temp   96.9 °F (36.1 °C) (01/18/18 0952)   Pulse   75 (01/18/18 0952)   Resp   18 (01/18/18 0952)     SpO2   96 % (01/18/18 0952)     Post Anesthesia Care and Evaluation    Patient location during evaluation: PACU  Patient participation: complete - patient participated  Level of consciousness: awake and alert  Pain score: 0  Pain management: adequate  Airway patency: patent  Anesthetic complications: No anesthetic complications  PONV Status: none  Cardiovascular status: hemodynamically stable and acceptable  Respiratory status: nonlabored ventilation, acceptable and nasal cannula  Hydration status: acceptable

## 2018-01-18 NOTE — PLAN OF CARE
Problem: Patient Care Overview (Adult)  Goal: Adult Individualization and Mutuality  Outcome: Ongoing (interventions implemented as appropriate)   01/18/18 0937   Individualization   Patient Specific Preferences none   Patient Specific Goals none   Patient Specific Interventions none   Mutuality/Individual Preferences   What Anxieties, Fears or Concerns Do You Have About Your Health or Care? none   What Questions Do You Have About Your Health or Care? none   What Information Would Help Us Give You More Personalized Care? none

## 2018-01-18 NOTE — INTERVAL H&P NOTE
H&P reviewed. The patient was examined and there are no changes to the H&P.     Dr. Morelos's note of 12/19/2017 was reviewed.     Heart: RRR  Lungs: CTAB    Immunizations:    Tetanus: Unknown     Influenza: 2017     Pneumo: No    Labs were reviewed.       Results for MAVERICK NUNEZ (MRN [id number]) as of 1/18/2018 10:19   Ref. Range 1/17/2018 12:21   Potassium Latest Ref Range: 3.5 - 5.5 mmol/L 3.7   WBC Latest Ref Range: 3.50 - 10.80 10*3/mm3 10.80   RBC Latest Ref Range: 3.89 - 5.14 10*6/mm3 3.95   Hemoglobin Latest Ref Range: 11.5 - 15.5 g/dL 13.1   Hematocrit Latest Ref Range: 34.5 - 44.0 % 39.0   RDW Latest Ref Range: 11.3 - 14.5 % 13.1   MCV Latest Ref Range: 80.0 - 99.0 fL 98.7   MCH Latest Ref Range: 27.0 - 31.0 pg 33.2 (H)   MCHC Latest Ref Range: 32.0 - 36.0 g/dL 33.6   MPV Latest Ref Range: 6.0 - 12.0 fL 10.2   Platelets Latest Ref Range: 150 - 450 10*3/mm3 301   RDW-SD Latest Ref Range: 37.0 - 54.0 fl 47.0     EKG: NSR    Plan:  Carpal Tunnel Release right

## 2018-01-18 NOTE — PLAN OF CARE
Problem: Perioperative Period (Adult)  Goal: Signs and Symptoms of Listed Potential Problems Will be Absent or Manageable (Perioperative Period)  Outcome: Ongoing (interventions implemented as appropriate)   01/18/18 0956   Perioperative Period   Problems Assessed (Perioperative Period) all   Problems Present (Perioperative Period) pain

## 2018-01-18 NOTE — ANESTHESIA PREPROCEDURE EVALUATION
Anesthesia Evaluation     Patient summary reviewed   no history of anesthetic complications:  NPO Solid Status: > 8 hours  NPO Liquid Status: > 2 hours     Airway   Mallampati: II  TM distance: >3 FB  Neck ROM: full  no difficulty expected  Dental    (+) upper dentures and lower dentures    Pulmonary    (+) a smoker Current Smoked day of surgery, COPD moderate, shortness of breath, sleep apnea on CPAP, decreased breath sounds, wheezes,   Cardiovascular   Exercise tolerance: poor (<4 METS)    ECG reviewed  Rhythm: regular  Rate: normal    (+) hypertension well controlled, past MI  >12 months, CAD, cardiac stents Drug eluting stent more than 12 months ago PVD, hyperlipidemia      Neuro/Psych  (+) headaches, numbness, psychiatric history Anxiety,     GI/Hepatic/Renal/Endo    (+)  GERD well controlled,     Musculoskeletal     (+) back pain, neck pain,   Abdominal   (+) obese,     Abdomen: soft.   Substance History      OB/GYN          Other   (+) arthritis                                           Anesthesia Plan    ASA 3     MAC     intravenous induction   Anesthetic plan and risks discussed with patient.    Plan discussed with CRNA.

## 2018-01-18 NOTE — H&P (VIEW-ONLY)
Patient: Gisselle White  : 1965     Primary Care Provider: No Known Provider     Requesting Provider: As above           History     Chief Complaint:   1.  Bilateral hand pain and numbness.  2.  Chronic neck pain.     History of Present Illness: Ms. White is a 52 year old woman with chronic neck and back difficulties.  She is followed in a pain clinic setting.  Norco helps with her low back but does not help in a sustained fashion for her neck pain.  On 17 she underwent left carpal tunnel release and is doing much better in that regard.  Her left hand pain and numbness is improved.  She continues to complain of symptoms on the right side.     Review of Systems   Constitutional: Positive for activity change, diaphoresis and fatigue. Negative for appetite change, chills, fever and unexpected weight change.   HENT: Positive for hearing loss. Negative for congestion, dental problem, drooling, ear discharge, ear pain, facial swelling, mouth sores, nosebleeds, postnasal drip, rhinorrhea, sinus pressure, sneezing, sore throat, tinnitus, trouble swallowing and voice change.    Eyes: Negative for photophobia, pain, discharge, redness, itching and visual disturbance.   Respiratory: Positive for apnea, cough, shortness of breath and wheezing. Negative for choking, chest tightness and stridor.    Cardiovascular: Positive for leg swelling. Negative for chest pain and palpitations.   Gastrointestinal: Negative for abdominal distention, abdominal pain, anal bleeding, blood in stool, constipation, diarrhea, nausea, rectal pain and vomiting.   Endocrine: Positive for heat intolerance and polydipsia. Negative for cold intolerance, polyphagia and polyuria.   Genitourinary: Positive for difficulty urinating and flank pain. Negative for decreased urine volume, dysuria, enuresis, frequency, genital sores, hematuria and urgency.   Musculoskeletal: Positive for arthralgias, back pain, gait problem, joint swelling, myalgias,  neck pain and neck stiffness.   Skin: Negative for color change, pallor, rash and wound.   Allergic/Immunologic: Positive for environmental allergies. Negative for food allergies and immunocompromised state.   Neurological: Positive for dizziness, weakness, numbness and headaches. Negative for tremors, seizures, syncope, facial asymmetry, speech difficulty and light-headedness.   Hematological: Negative for adenopathy. Does not bruise/bleed easily.   Psychiatric/Behavioral: Positive for dysphoric mood and sleep disturbance. Negative for agitation, behavioral problems, confusion, decreased concentration, hallucinations, self-injury and suicidal ideas. The patient is nervous/anxious. The patient is not hyperactive.          The patient's past medical history, past surgical history, family history, and social history have been reviewed at length in the electronic medical record.     Past Medical History:   Diagnosis Date   • Anxiety    • Arthritis    • Back pain    • COPD (chronic obstructive pulmonary disease)    • Depression    • Full dentures    • GERD (gastroesophageal reflux disease)    • Heart disease    • Hyperlipidemia    • Hypertension    • MI (myocardial infarction)     2003- 1 stent placed   • Migraine    • On home O2     2L prn    • ALONZO on CPAP     complaint    • Sleep apnea    • SOB (shortness of breath)    • Wears glasses      Past Surgical History:   Procedure Laterality Date   • APPENDECTOMY     • CAROTID STENT     • CARPAL TUNNEL RELEASE Right 2011   • CARPAL TUNNEL RELEASE Left 11/2/2017    Procedure: CARPAL TUNNEL RELEASE LEFT;  Surgeon: Leandro Morelos MD;  Location: [facility];  Service:    • COLONOSCOPY     • FRACTURE SURGERY      right arm- no hardware   • HYSTERECTOMY      partial left ovary   • TUBAL ABDOMINAL LIGATION     • WISDOM TOOTH EXTRACTION       Family History   Problem Relation Age of Onset   • Stroke Mother    • Heart disease Mother    • Diabetes Mother    • Hypertension Mother    •  "Cancer Father    • Cancer Sister    • Stroke Sister    • Hypertension Sister    • Hypertension Brother      Social History     Social History   • Marital status:      Spouse name: N/A   • Number of children: N/A   • Years of education: N/A     Occupational History   • Not on file.     Social History Main Topics   • Smoking status: Current Every Day Smoker     Packs/day: 0.50     Years: 33.00     Types: Cigarettes   • Smokeless tobacco: Never Used   • Alcohol use Yes      Comment: occasional   • Drug use: No   • Sexual activity: Defer     Other Topics Concern   • Not on file     Social History Narrative     Allergies   Allergen Reactions   • Claritin [Loratadine] Hallucinations     Bugs on wall    • Codeine Hallucinations   • Darvon [Propoxyphene] Hallucinations       Physical Exam:   Temp 96.5 °F (35.8 °C)  Ht 165.1 cm (65\")  Wt 95.3 kg (210 lb)  BMI 34.95 kg/m2  MUSCULOSKELETAL:  Neck tenderness to palpation is not observed.   ROM in neck is normal.  Her left carpal tunnel incision is healing nicely.  NEUROLOGICAL:  Strength is intact in the upper and lower extremities to direct testing.  Muscle tone is normal throughout.  Station and gait are normal.  Sensation is intact to light touch testing throughout.  Deep tendon reflexes are 1+ and symmetrical.  Frederic's Sign is negative bilaterally.     Medical Decision Making     Data Review:   Electrodiagnostic studies have previously documented bilateral carpal tunnel syndrome.     Diagnosis:   1.  Bilateral carpal tunnel syndrome status post release on the left.  2.  Chronic neck pain, mechanical in nature.     Treatment Options:   We will make arrangements to pursue the second stage of her carpal tunnel treatment, right carpal tunnel release.  She would like to pursue this in January.  Once again she is well acquainted with the potential risks, complications, and limitations of the intervention.          Diagnosis Plan   1. History of carpal tunnel release "      2. Bilateral carpal tunnel syndrome      3. Cervical spondylosis without myelopathy

## 2018-01-30 ENCOUNTER — OFFICE VISIT (OUTPATIENT)
Dept: NEUROSURGERY | Facility: CLINIC | Age: 53
End: 2018-01-30

## 2018-01-30 ENCOUNTER — TELEPHONE (OUTPATIENT)
Dept: NEUROLOGY | Facility: CLINIC | Age: 53
End: 2018-01-30

## 2018-01-30 VITALS
BODY MASS INDEX: 33.22 KG/M2 | TEMPERATURE: 96.5 F | DIASTOLIC BLOOD PRESSURE: 74 MMHG | HEIGHT: 65 IN | SYSTOLIC BLOOD PRESSURE: 122 MMHG | WEIGHT: 199.4 LBS

## 2018-01-30 DIAGNOSIS — G89.29 CHRONIC NECK AND BACK PAIN: Primary | ICD-10-CM

## 2018-01-30 DIAGNOSIS — Z98.890 HISTORY OF CARPAL TUNNEL RELEASE: ICD-10-CM

## 2018-01-30 DIAGNOSIS — G56.03 BILATERAL CARPAL TUNNEL SYNDROME: Primary | ICD-10-CM

## 2018-01-30 DIAGNOSIS — M79.7 FIBROMYALGIA: ICD-10-CM

## 2018-01-30 DIAGNOSIS — M54.2 CHRONIC NECK AND BACK PAIN: Primary | ICD-10-CM

## 2018-01-30 DIAGNOSIS — M54.9 CHRONIC NECK AND BACK PAIN: Primary | ICD-10-CM

## 2018-01-30 DIAGNOSIS — M54.12 CERVICAL RADICULOPATHY: ICD-10-CM

## 2018-01-30 DIAGNOSIS — G56.03 BILATERAL CARPAL TUNNEL SYNDROME: ICD-10-CM

## 2018-01-30 PROCEDURE — 99024 POSTOP FOLLOW-UP VISIT: CPT | Performed by: PHYSICIAN ASSISTANT

## 2018-01-30 RX ORDER — DOXEPIN HYDROCHLORIDE 10 MG/1
10 CAPSULE ORAL NIGHTLY
Qty: 30 CAPSULE | Refills: 11 | Status: SHIPPED | OUTPATIENT
Start: 2018-01-30 | End: 2018-06-07 | Stop reason: SDUPTHER

## 2018-01-30 RX ORDER — RANITIDINE 150 MG/1
150 TABLET ORAL 2 TIMES DAILY
COMMUNITY

## 2018-01-30 NOTE — PROGRESS NOTES
[facility]    Patient: Gisselle White      Date: 18  : 1965   MRN: [id number]  ______________________________________________________________________      CHIEF COMPLIANT:     1.  Bilateral hand pain and numbness.    HISTORY OF PRESENT ILLNESS  Ms. White is a 52 y.o. woman who presents today for postop follow up. She initially presented with pain and sensory alteration involving her hands and fingers.  Studies demonstrated significant carpal tunnel syndrome bilaterally.   On 17, she underwent left carpal tunnel release and is doing much better in that regard.  Her left hand pain and numbness improved.  She continued to have symptoms on the right side and subsequently underwent a right carpal tunnel release on 18.  Today she reports that her right hand pain and numbness are also improved.  Her incision is healing well.   No fevers, chills, malaise.    The patient's past medical history, past surgical history, family history, and social history have been reviewed at length in the electronic medical record.      Past Medical History:   Diagnosis Date   • Anxiety    • Arthritis    • Back pain    • COPD (chronic obstructive pulmonary disease)    • Coronary artery disease     1 stent   • Deaf, right    • Depression    • Full dentures    • GERD (gastroesophageal reflux disease)    • Heart disease    • [place] (hard of hearing)    • Hyperlipidemia    • Hypertension    • MI (myocardial infarction)     -  stent placed   • Migraine    • On home O2     2L prn    • ALONZO on CPAP     complaint    • Sleep apnea    • SOB (shortness of breath)    • Wears glasses      Past Surgical History:   Procedure Laterality Date   • APPENDECTOMY     • CARPAL TUNNEL RELEASE Right    • CARPAL TUNNEL RELEASE Left 2017    Procedure: CARPAL TUNNEL RELEASE LEFT;  Surgeon: Leandro Morelos MD;  [facility];  Service:    • CARPAL TUNNEL RELEASE Right  "1/18/2018    Procedure: CARPAL TUNNEL RELEASE RIGHT ;  Surgeon: Leandro Morelos MD;  Location: [facility];  Service:    • COLONOSCOPY      2017   • FRACTURE SURGERY      right arm- no hardware   • HYSTERECTOMY      partial left ovary   • TEETH EXTRACTION      FULL MOUTH   • TUBAL ABDOMINAL LIGATION       Social History     Social History   • Marital status:      Spouse name: N/A   • Number of children: N/A   • Years of education: N/A     Occupational History   • Not on file.     Social History Main Topics   • Smoking status: Current Every Day Smoker     Packs/day: 0.25     Years: 33.00     Types: Cigarettes   • Smokeless tobacco: Never Used   • Alcohol use Yes      Comment: social   • Drug use: No   • Sexual activity: Defer     Other Topics Concern   • Not on file     Social History Narrative     Ms. White has a current medication list which includes the following prescription(s): albuterol, aspirin, baclofen, benzonatate, budesonide-formoterol, buspirone, celecoxib, cetirizine, doxepin, fluticasone, furosemide, gabapentin, hydrocodone-acetaminophen, metoprolol tartrate, ranitidine, ropinirole, simvastatin, and venlafaxine xr. See EMR for details.  She is allergic to claritin [loratadine]; codeine; and darvon [propoxyphene].      Review of Systems   Constitutional: Negative for chills, diaphoresis and fever.   Musculoskeletal:        Chronic back and neck pains.     Skin: Negative for color change, pallor and rash.        See HPI   Neurological:        See HPI        OBJECTIVE  Vitals: Blood pressure 122/74, temperature 96.5 °F (35.8 °C), temperature source Temporal Artery , height 165.1 cm (65\"), weight 90.4 kg (199 lb 6.4 oz), not currently breastfeeding.  Body mass index is 33.18 kg/(m^2).    Physical Exam  Nylon sutures removed today.  Skin incision of right wrist is well approximated at appropriate stage of healing w/o s/s of infection.  Well healed scar on left wrist.       Medical Decision " Making    ASSESSMENT  1. Right carpal tunnel syndrome s/p carpal tunnel release  2. Left carpal tunnel syndrome s/p carpal tunnel release      DISCUSSION  Ms. White is stable and doing well.  Her bilateral hand numbness has improved.  Her incision is healing well with no s/s of infection.  Continue to advance activity as tolerated.  Return for f/up in our clinic PRN. She should call or RTC sooner if symptoms worsen or new symptoms develop.     Leanne Agustin PA-C  [facility]  01/30/18    ______________________________________________  Patient Care Team:  No Known Provider as PCP - General  Sheldon Olvera MD as Consulting Physician (Neurology)

## 2018-01-30 NOTE — TELEPHONE ENCOUNTER
----- Message from Antonette Castañeda sent at 1/30/2018  3:39 PM EST -----  Regarding: DR. CHAPMAN  PATIENT CALLED AND IS OUT OF doxepin, DOES DR. CHAPMAN WANT HER TO CONTINUE THOSE?  I VERIFIED THE PHONE.

## 2018-03-21 RX ORDER — BACLOFEN 10 MG/1
TABLET ORAL
Qty: 90 TABLET | Refills: 1 | Status: SHIPPED | OUTPATIENT
Start: 2018-03-21

## 2018-06-07 ENCOUNTER — OFFICE VISIT (OUTPATIENT)
Dept: NEUROLOGY | Facility: CLINIC | Age: 53
End: 2018-06-07

## 2018-06-07 ENCOUNTER — APPOINTMENT (OUTPATIENT)
Dept: LAB | Facility: HOSPITAL | Age: 53
End: 2018-06-07

## 2018-06-07 VITALS
BODY MASS INDEX: 31.62 KG/M2 | SYSTOLIC BLOOD PRESSURE: 142 MMHG | WEIGHT: 190 LBS | DIASTOLIC BLOOD PRESSURE: 84 MMHG | HEART RATE: 75 BPM | OXYGEN SATURATION: 98 %

## 2018-06-07 DIAGNOSIS — M54.12 CERVICAL RADICULOPATHY: ICD-10-CM

## 2018-06-07 DIAGNOSIS — M54.2 CHRONIC NECK AND BACK PAIN: ICD-10-CM

## 2018-06-07 DIAGNOSIS — M79.7 FIBROMYALGIA: Primary | ICD-10-CM

## 2018-06-07 DIAGNOSIS — M54.9 CHRONIC NECK AND BACK PAIN: ICD-10-CM

## 2018-06-07 DIAGNOSIS — Z51.81 THERAPEUTIC DRUG MONITORING: ICD-10-CM

## 2018-06-07 DIAGNOSIS — G89.29 CHRONIC NECK AND BACK PAIN: ICD-10-CM

## 2018-06-07 DIAGNOSIS — L98.9 SKIN LESION: ICD-10-CM

## 2018-06-07 DIAGNOSIS — G56.03 BILATERAL CARPAL TUNNEL SYNDROME: ICD-10-CM

## 2018-06-07 LAB
AMPHET+METHAMPHET UR QL: NEGATIVE
AMPHETAMINES UR QL: NEGATIVE
BARBITURATES UR QL SCN: NEGATIVE
BENZODIAZ UR QL SCN: NEGATIVE
BUPRENORPHINE SERPL-MCNC: NEGATIVE NG/ML
CANNABINOIDS SERPL QL: POSITIVE
COCAINE UR QL: NEGATIVE
METHADONE UR QL SCN: NEGATIVE
OPIATES UR QL: POSITIVE
OXYCODONE UR QL SCN: NEGATIVE
PCP UR QL SCN: NEGATIVE
PROPOXYPH UR QL: NEGATIVE
TRICYCLICS UR QL SCN: NEGATIVE

## 2018-06-07 PROCEDURE — 80306 DRUG TEST PRSMV INSTRMNT: CPT | Performed by: NURSE PRACTITIONER

## 2018-06-07 PROCEDURE — 99214 OFFICE O/P EST MOD 30 MIN: CPT | Performed by: NURSE PRACTITIONER

## 2018-06-07 RX ORDER — DOXEPIN HYDROCHLORIDE 25 MG/1
25 CAPSULE ORAL NIGHTLY
Qty: 30 CAPSULE | Refills: 5 | Status: SHIPPED | OUTPATIENT
Start: 2018-06-07

## 2018-06-07 NOTE — PROGRESS NOTES
Subjective:     Patient ID: Gisselle White is a 52 y.o. female.    CC:   Chief Complaint   Patient presents with   • fibromyalgia       HPI:   History of Present Illness     This is a 52-year-old female who presents for 7 month follow of long term diagnosis of fibromyalgia, chronic neck and lower back pain as well as carpal tunnel syndrome.  She is a routine patient of Dr. Olvera Neurology in our office.  Since last visit she had a carotid ultrasound on 1/18/18 which showed no hemodynamically significant stenosis.  She is on aspirin and simvastatin.  She also had a right carpal tunnel release surgery by Dr. Morelos.  She is followed by Dr. Chris Madera with [facility] for chronic pain.  She has been taking doxepin 10 mg at night for sleep and nerve pain.  She is wondering about increasing this dosage.  She has also been prescribed gabapentin 800 mg 4 times a day by Dr. Olvera for Fibromyalgia and she reports moderate relief of chronic paresthesias and myalgias. She has been prescribed hydrocodone by pain management.  She is due for a urine drug screen today.  She tells me that she will be positive for marijuana.  She is also asking for referral to dermatology to evaluate some skin lesions. No new concerns today.    The following portions of the patient's history were reviewed and updated as appropriate: allergies, current medications, past family history, past medical history, past social history, past surgical history and problem list.    Past Medical History:   Diagnosis Date   • Anxiety    • Arthritis    • Back pain    • COPD (chronic obstructive pulmonary disease)    • Coronary artery disease     1 stent   • Deaf, right    • Depression    • Full dentures    • GERD (gastroesophageal reflux disease)    • Heart disease    • [place] (hard of hearing)    • Hyperlipidemia    • Hypertension    • MI (myocardial infarction)     2003- 1 stent placed   • Migraine    • On home O2     2L prn    • ALONZO on CPAP     complaint    • Sleep  apnea    • SOB (shortness of breath)    • Wears glasses        Past Surgical History:   Procedure Laterality Date   • APPENDECTOMY     • CARPAL TUNNEL RELEASE Right 2011   • CARPAL TUNNEL RELEASE Left 11/2/2017    Procedure: CARPAL TUNNEL RELEASE LEFT;  Surgeon: Leandro Morelos MD;  Location:  [facility];  Service:    • CARPAL TUNNEL RELEASE Right 1/18/2018    Procedure: CARPAL TUNNEL RELEASE RIGHT ;  Surgeon: Leandro Morelos MD;  Location:  [facility];  Service:    • COLONOSCOPY      2017   • FRACTURE SURGERY      right arm- no hardware   • HYSTERECTOMY      partial left ovary   • TEETH EXTRACTION      FULL MOUTH   • TUBAL ABDOMINAL LIGATION         Social History     Social History   • Marital status:      Spouse name: N/A   • Number of children: N/A   • Years of education: N/A     Occupational History   • Not on file.     Social History Main Topics   • Smoking status: Current Every Day Smoker     Packs/day: 0.25     Years: 33.00     Types: Cigarettes   • Smokeless tobacco: Never Used   • Alcohol use Yes      Comment: social   • Drug use: No   • Sexual activity: Defer     Other Topics Concern   • Not on file     Social History Narrative   • No narrative on file       Family History   Problem Relation Age of Onset   • Stroke Mother    • Heart disease Mother    • Diabetes Mother    • Hypertension Mother    • Cancer Father    • Cancer Sister    • Stroke Sister    • Hypertension Sister    • Hypertension Brother         Review of Systems   Constitutional: Negative for chills, fatigue, fever and unexpected weight change.   HENT: Positive for ear pain, hearing loss and postnasal drip. Negative for nosebleeds, rhinorrhea and sore throat.    Eyes: Positive for itching. Negative for photophobia, pain, discharge and visual disturbance.   Respiratory: Positive for shortness of breath and wheezing. Negative for cough and chest tightness.    Cardiovascular: Negative for chest pain, palpitations and leg swelling.    Gastrointestinal: Negative for abdominal pain, blood in stool, constipation, diarrhea, nausea and vomiting.   Genitourinary: Negative for dysuria, frequency, hematuria and urgency.   Musculoskeletal: Positive for arthralgias, back pain, gait problem, myalgias, neck pain and neck stiffness. Negative for joint swelling.   Skin: Negative for rash and wound.   Allergic/Immunologic: Negative for environmental allergies and food allergies.   Neurological: Positive for weakness and numbness. Negative for dizziness, tremors, seizures, syncope, speech difficulty, light-headedness and headaches.   Hematological: Negative for adenopathy. Bruises/bleeds easily.   Psychiatric/Behavioral: Positive for decreased concentration, dysphoric mood and sleep disturbance. Negative for agitation, confusion, hallucinations and suicidal ideas. The patient is nervous/anxious.    All other systems reviewed and are negative.       Objective:    Neurologic Exam     Mental Status   Oriented to person, place, and time.   Speech: speech is normal   Level of consciousness: alert    Cranial Nerves   Cranial nerves II through XII intact.     Motor Exam   Muscle bulk: normal  Overall muscle tone: normal    Strength   Strength 5/5 except as noted.   Right strength: Limited ROM turning neck to right side chronic    Gait, Coordination, and Reflexes     Gait  Gait: wide-based    Coordination   Finger to nose coordination: normal    Tremor   Resting tremor: absent  Intention tremor: absent  Action tremor: absent    Reflexes   Right brachioradialis: 2+  Left brachioradialis: 2+  Right biceps: 2+  Left biceps: 2+  Right triceps: 2+  Left triceps: 2+  Right patellar: 2+  Left patellar: 2+  Right achilles: 2+  Left achilles: 2+  Right : 2+  Left : 2+      Physical Exam   Constitutional: She is oriented to person, place, and time.   Musculoskeletal:   Generalized tenderness   Neurological: She is oriented to person, place, and time. She has a normal  Finger-Nose-Finger Test.   Reflex Scores:       Tricep reflexes are 2+ on the right side and 2+ on the left side.       Bicep reflexes are 2+ on the right side and 2+ on the left side.       Brachioradialis reflexes are 2+ on the right side and 2+ on the left side.       Patellar reflexes are 2+ on the right side and 2+ on the left side.       Achilles reflexes are 2+ on the right side and 2+ on the left side.  Skin: Lesion (multiple flesh colored, raised, <2mm lesions on bilateral thighs, left arm noted) noted.   Psychiatric: Her speech is normal and behavior is normal. Judgment and thought content normal. Her mood appears anxious. Cognition and memory are normal.       Assessment/Plan:       Gisselle was seen today for fibromyalgia.    Diagnoses and all orders for this visit:    Fibromyalgia  -     Cancel: Urine Drug Screen - Urine, Clean Catch  -     doxepin (SINEquan) 25 MG capsule; Take 1 capsule by mouth Every Night.  -     Urine Drug Screen - Urine, Clean Catch    Therapeutic drug monitoring  -     Cancel: Urine Drug Screen - Urine, Clean Catch  -     Urine Drug Screen - Urine, Clean Catch    Bilateral carpal tunnel syndrome  -     doxepin (SINEquan) 25 MG capsule; Take 1 capsule by mouth Every Night.    Chronic neck and back pain  -     doxepin (SINEquan) 25 MG capsule; Take 1 capsule by mouth Every Night.    Cervical radiculopathy  -     doxepin (SINEquan) 25 MG capsule; Take 1 capsule by mouth Every Night.    Skin lesion  -     Ambulatory Referral to Dermatology         We will increase her doxepin at 25 mg at night.  She'll have a urine drug screen today.  If urine drug screen is positive that we will defer her gabapentin prescription to pain management he was better equipped to manage this.  We will refer her to dermatology to evaluate multiple skin lesions.  She will follow-up in 6 months with Dr. Olvera as scheduled. Reviewed medications, potential side effects and signs and symptoms to report. Discussed  risk versus benefits of treatment plan with patient and/or family-including medications, labs and radiology that may be ordered. Addressed questions and concerns during visit. Patient and/or family verbalized understanding and agree with plan.    As part of this patient's treatment plan I am prescribing controlled substances. The patient has been made aware of appropriate use of such medications, including potential risk of somnolence, limited ability to drive and/or work safely, and potential for dependence or overdose. It has also been made clear that these medications are for use by the patient only, without concomitant use of alcohol or other substances unless prescribed. Keep out of reach of children.  [place]ola report has been reviewed. If this is going to be prescribed long term, [facility] Controlled Substance Agreement Contract has also been read and signed by patient and myself. Pola #[id number]. If Positive for Marijuana or any other drugs not on pola will defer to pain management. Patient is aware of our policies. Dr. Olvera aware of plan-discussed with him.    During this visit the following were done:  Labs Reviewed []    Labs Ordered [x]    Radiology Reports Reviewed [x]    Radiology Ordered []    PCP Records Reviewed []    Referring Provider Records Reviewed []    ER Records Reviewed []    Hospital Records Reviewed []    History Obtained From Family []    Radiology Images Reviewed []    Other Reviewed []    Records Requested []      EMR Dragon/Transcription Disclaimer:  Much of this encounter note is an electronic transcription of spoken language to printed text. Electronic transcription of spoken language may permit erroneous words or phrases to be inadvertently transcribed. Although I have reviewed the note for such errors, some may still exist in this documentation.    Coby Luque, APRN  6/7/2018

## 2018-06-09 ENCOUNTER — TELEPHONE (OUTPATIENT)
Dept: NEUROLOGY | Facility: CLINIC | Age: 53
End: 2018-06-09

## 2018-06-10 NOTE — TELEPHONE ENCOUNTER
Please notify patient her UDS is positive for marijuana. I discussed this with Dr. Olvera and since she is already seeing pain management, we request they take over her gabapentin script. Please call [facility] to see if they can continue her gabapentin script. thanks

## 2018-06-13 ENCOUNTER — TELEPHONE (OUTPATIENT)
Dept: NEUROLOGY | Facility: CLINIC | Age: 53
End: 2018-06-13

## 2018-06-13 NOTE — TELEPHONE ENCOUNTER
Pt is informed and states that her pain management is willing to take over her Gabapentin.  She will decide if she wants one of her other doctors to take over her Doxepin too. She doesn't think she will need to keep seeing us.  I told her that would be up to her and let us know if she needed anything else.

## 2018-06-27 DIAGNOSIS — M54.9 CHRONIC NECK AND BACK PAIN: ICD-10-CM

## 2018-06-27 DIAGNOSIS — G89.29 CHRONIC NECK AND BACK PAIN: ICD-10-CM

## 2018-06-27 DIAGNOSIS — M79.7 FIBROMYALGIA: ICD-10-CM

## 2018-06-27 DIAGNOSIS — M54.2 CHRONIC NECK AND BACK PAIN: ICD-10-CM

## 2018-06-27 DIAGNOSIS — G56.03 BILATERAL CARPAL TUNNEL SYNDROME: ICD-10-CM

## 2018-06-27 RX ORDER — GABAPENTIN 800 MG/1
TABLET ORAL
Qty: 120 TABLET | Refills: 4 | OUTPATIENT
Start: 2018-06-27

## 2019-01-08 DIAGNOSIS — M54.2 CHRONIC NECK AND BACK PAIN: ICD-10-CM

## 2019-01-08 DIAGNOSIS — G56.03 BILATERAL CARPAL TUNNEL SYNDROME: ICD-10-CM

## 2019-01-08 DIAGNOSIS — G89.29 CHRONIC NECK AND BACK PAIN: ICD-10-CM

## 2019-01-08 DIAGNOSIS — M54.9 CHRONIC NECK AND BACK PAIN: ICD-10-CM

## 2019-01-08 DIAGNOSIS — M79.7 FIBROMYALGIA: ICD-10-CM

## 2019-01-08 DIAGNOSIS — M54.12 CERVICAL RADICULOPATHY: ICD-10-CM

## 2019-01-08 RX ORDER — DOXEPIN HYDROCHLORIDE 25 MG/1
CAPSULE ORAL
Qty: 30 CAPSULE | Refills: 2 | OUTPATIENT
Start: 2019-01-08

## (undated) DEVICE — NDL HYPO ECLPS SFTY 25G 1 1/2IN

## (undated) DEVICE — ENCORE® LATEX MICRO SIZE 7.5, STERILE LATEX POWDER-FREE SURGICAL GLOVE: Brand: ENCORE

## (undated) DEVICE — SPNG GZ WOVN 4X4IN 12PLY 10/BX STRL

## (undated) DEVICE — STCKNT STRL 4X48 IN

## (undated) DEVICE — PK EXTREM UPPR 10

## (undated) DEVICE — DRSNG TELFA PAD NONADH STR 1S 3X8IN

## (undated) DEVICE — APPL CHLORAPREP W/TINT 26ML BLU

## (undated) DEVICE — DISPOSABLE TOURNIQUET CUFF SINGLE BLADDER, DUAL PORT AND QUICK CONNECT CONNECTOR: Brand: COLOR CUFF

## (undated) DEVICE — CANN NASL CO2 DIVIDED A/

## (undated) DEVICE — SHEET,DRAPE,53X77,STERILE: Brand: MEDLINE

## (undated) DEVICE — ST EXT MICROBORE FIX M LL 38IN

## (undated) DEVICE — CVR HNDL LT SURG ACCSSRY BLU STRL

## (undated) DEVICE — 2963 MEDIPORE SOFT CLOTH TAPE 3 IN X 10 YD 12 RLS/CS: Brand: 3M™ MEDIPORE™

## (undated) DEVICE — DISPOSABLE BIPOLAR FORCEPS 5 1/2" (14CM) SEMKIN, 0.7MM TIP AND 12 FT. (3.6M) CABLE: Brand: KIRWAN

## (undated) DEVICE — 3M™ MEDIPORE™ H SOFT CLOTH SURGICAL TAPE, 2863, 3 IN X 10 YD, 12/CASE: Brand: 3M™ MEDIPORE™